# Patient Record
Sex: FEMALE | Race: WHITE | NOT HISPANIC OR LATINO | Employment: OTHER | URBAN - METROPOLITAN AREA
[De-identification: names, ages, dates, MRNs, and addresses within clinical notes are randomized per-mention and may not be internally consistent; named-entity substitution may affect disease eponyms.]

---

## 2017-01-13 ENCOUNTER — OFFICE VISIT (OUTPATIENT)
Dept: MAMMOGRAPHY | Facility: CLINIC | Age: 77
End: 2017-01-13

## 2017-01-13 VITALS
HEART RATE: 98 BPM | SYSTOLIC BLOOD PRESSURE: 132 MMHG | HEIGHT: 63 IN | DIASTOLIC BLOOD PRESSURE: 88 MMHG | WEIGHT: 145 LBS | BODY MASS INDEX: 25.69 KG/M2 | OXYGEN SATURATION: 97 % | TEMPERATURE: 97.8 F

## 2017-01-13 DIAGNOSIS — N63.20 LEFT BREAST LUMP: Primary | ICD-10-CM

## 2017-01-13 PROCEDURE — 99215 OFFICE O/P EST HI 40 MIN: CPT | Performed by: SURGERY

## 2017-01-13 RX ORDER — CLOBETASOL PROPIONATE 0.5 MG/G
OINTMENT TOPICAL
Refills: 0 | COMMUNITY
Start: 2016-12-26 | End: 2017-01-27

## 2017-01-13 RX ORDER — MAGNESIUM 200 MG
TABLET ORAL
COMMUNITY
End: 2017-06-22

## 2017-01-13 NOTE — MR AVS SNAPSHOT
Jessica Davies   1/13/2017 8:45 AM   Office Visit    Dept Phone:  [phone]   Encounter #:  [id number]    Provider:  Oniel Vilchis MD   Department:  [facility] BREAST SURGERY                Your Full Care Plan              Your Updated Medication List          This list is accurate as of: 1/13/17  9:39 AM.  Always use your most recent med list.                CALCIUM + D PO       clobetasol 0.05 % ointment   Commonly known as:  TEMOVATE       clonazePAM 1 MG tablet   Commonly known as:  KlonoPIN   TAKE 1 TABLET BY MOUTH EVERY NIGHT AT BEDTIME       Fish Oil 1360 MG capsule       HYDROcodone-acetaminophen  MG per tablet   Commonly known as:  NORCO       magnesium oxide 400 (241.3 MG) MG tablet tablet   Commonly known as:  MAGOX       MULTIVITAL PO       nystatin 386085 UNIT/GM powder   Commonly known as:  MYCOSTATIN       PROBIOTIC ADVANCED PO       Vitamin B-12 1000 MCG sublingual tablet       Vitamin D 1000 UNITS tablet               Instructions     None    Patient Instructions History      Upcoming Appointments     Visit Type Date Time Department    NEW PATIENT 1/13/2017  8:45 AM [facility]    OFFICE VISIT 6/22/2017 11:00 AM [facility]    FOLLOW UP 7/14/2017  9:30 AM [facility]      MyChart Signup     Our records indicate that you have an active [employer] account.    You can view your After Visit Summary by going to [employer] and logging in with your [employer] username and password.  If you don't have a [employer] username and password but a parent or guardian has access to your record, the parent or guardian should login with their own [employer] username and password and access your record to view the After Visit Summary.    If you have questions, you can email [email or web address] or call [phone] to talk to our [employer] staff.  Remember, [employer] is NOT to be used for urgent needs.  For  "medical emergencies, dial 911.               Other Info from Your Visit           Your Appointments     Jun 22, 2017 11:00 AM EDT   Office Visit with Mike Worrell MD   [facility] PRIMARY CARE (--)    [address]  [facility] [address] [phone]           Arrive 15 minutes prior to appointment.            Jul 14, 2017  9:30 AM EDT   Follow Up with Oniel Vilchis MD   [facility] BREAST SURGERY (--)    [address]  [facility] [address] [phone]           Arrive 15 minutes prior to appointment.              Other Notes About Your Plan     LAST HARRISON 06/06/2016        Allergies     Azithromycin      Cephalexin      Penicillins      Sulfamethoxazole-trimethoprim        Reason for Visit     Mass           Vital Signs     Blood Pressure Pulse Temperature Height Weight Oxygen Saturation    132/88 (BP Location: Left arm) 98 97.8 °F (36.6 °C) 63\" (160 cm) 145 lb (65.8 kg) 97%    Body Mass Index Smoking Status                25.69 kg/m2 Former Smoker            "

## 2017-01-13 NOTE — LETTER
January 14, 2017     Mike Worrell MD  [address]  [facility] [address]    Patient: Jessica Davies   YOB: 1940   Date of Visit: 1/13/2017       Dear Dr. Gm MD:    Thank you for referring Jessica Davies to me for evaluation. Below are the relevant portions of my assessment and plan of care.  Plan:  It has been a number of years since I have seen her but this palpable area does not seem changed in any way on physical examination.  We talked a little bit about the necessity of continued mammographic follow-up.  She has had breast cancers on both sides and although she doesn't need an increased frequency of mammograms she certainly needs to do yearly studies.  We have ordered a mammogram and an ultrasound at the [facility] where she has been getting all of her other films.  I have asked her to call me 2 days after that procedure is done so we can discuss the findings.    If you have questions, please do not hesitate to call me. I look forward to following Jessica along with you.         Sincerely,        Oniel Vilchis MD        CC: No Recipients  Oniel Vilchis MD  1/14/2017 10:55 AM  Signed  Chief Complaint: Jessica Davies is a 76 y.o.. female here today for Mass        History of Present Illness:  Patient presents with breast mass.  She is a nice 76-year-old white female that I last saw in June 2014.  At that time she had discovered a small pea-sized lump in the left breast which had been there approximately 6 months.  Her past history is significant for right breast conserving surgery in 1996 but she did not have radiation therapy or or endocrine blocking therapy.  In 2003 she had a cancer in the left breast treated with breast conserving surgery.  She did receive radiation after that surgery and took endocrine blocking therapy for a short period of time.  At that time her imaging studies were normal and on exam there was a very small BB sized  area in the midportion of her lumpectomy incision.  I felt this likely represented either nodular breast tissue or a small focus of fat necrosis.  The patient can still feel the area but does not feel that it has changed in any way.  She is nervous about getting additional radiation through mammography and her last imaging studies were March 2015.  At that time there was nothing suspicious seen on her mammogram.  She is here today for follow-up of this area.      Review of Systems:  Review of Systems   Constitutional: Positive for unexpected weight change.        20 pound weight gain over 4 years   HENT:   Positive for hearing loss and tinnitus.    Eyes: Positive for eye problems.        Cataracts   Respiratory: Positive for chest tightness.    Endocrine: Positive for hot flashes.   Genitourinary: Positive for dyspareunia and nocturia.    Musculoskeletal: Positive for back pain, gait problem, myalgias and neck stiffness.        Limited ROM in shoulder   Neurological: Positive for gait problem.   All other systems reviewed and are negative.       Past Medical and Surgical History:  Breast Biopsy History:  Patient has had the following breast biopsies:Left breast 2003- malignant, Right breast 1996- Malignant  Breast Cancer HIstory:  Patient has the following past medical history of breast cancer treatment: Right breast cancer 1996-lumpectomy, Left breast Cancer 2003- lumpectomy with radiation.   Breast Operations, and year:  Left Lumpectomy 2003, Right Lumpectomy 1996    History   Smoking Status   • Former Smoker   • Packs/day: 3.00   • Types: Cigarettes   • Start date: 1964   • Quit date: 1974   Smokeless Tobacco   • Not on file     Comment: quit 40 yrs ago     Obstetric History:  Patient is postmenopausal, entered menopause naturally at age: 50   Number of pregnancies:2  Number of live births: 2  Number of abortions or miscarriages: 0  Age of delivery of first child: 22  Patient breast fed, for the following lenth  of time:2 months  Length of time taking birth control pills:8 to 10 years  Patient took hormone replacement during the following dates: off and on for a few years. Unsure of exact dates    Past Surgical History   Procedure Laterality Date   • Breast surgery     • D&c and laparoscopy     • Mohs surgery     • Cataract extraction  2014       Past Medical History   Diagnosis Date   • Birth defect    • Cancer      Breast   • DDD (degenerative disc disease), cervical      Neck and Lumbar   • Diabetes mellitus type 2, controlled, without complications    • Hyperlipidemia        Prior Hospitalizations, other than for surgery or childbirth, and year:  none    Social History:  Patient is .  Patient has 1 daughters. and Patient has 1 sons.    Family History:  Family History   Problem Relation Age of Onset   • Early death Mother    • Alcohol abuse Mother    • Asthma Mother    • Birth defects Mother    • Heart attack Mother    • Heart failure Mother    • Cancer Father      Prostate   • Heart disease Father    • Diabetes Father    • Heart attack Father    • Prostate cancer Father 70   • Hyperlipidemia Sister    • Diabetes Paternal Grandmother    • Diabetes Paternal Grandfather    • Heart failure Maternal Aunt    • Brain cancer Maternal Aunt    • Asthma Paternal Aunt        Vital Signs:  Vitals:    01/13/17 0901   BP: 132/88   Pulse: 98   Temp: 97.8 °F (36.6 °C)   SpO2: 97%       Medications:    Current Outpatient Prescriptions:   •  Prenatal Vit-Min-FA-Fish Oil ([employer] PRENATAL GUMMY PO), Take 2 tablets by mouth daily., Disp: , Rfl:      Physical Examination:  General Appearance:   Patient is in no distress.  She is well kept and has an average build.   Psychiatric:  Patient with appropriate mood and affect. Alert and oriented to self, time, and place.    Breast, RIGHT:  small sized, symmetric with the contralateral side.  Breast skin is without erythema, edema, rashes.  There are no visible abnormalities upon inspection  during the arm-raising maneuver or with hands on hips in the sitting position. There is no nipple retraction, discharge or nipple/areolar skin changes.There are no masses palpable in the sitting or supine positions.  There is a lumpectomy scar in the far lateral aspect of the breast with some associated architectural distortion.    Breast, LEFT:  small sized, symmetric with the contralateral side.  Breast skin is without erythema, edema, rashes.  There are no visible abnormalities upon inspection during the arm-raising maneuver or with hands on hips in the sitting position. There is no nipple retraction, discharge or nipple/areolar skin changes. she has an incision at the superior aspect of the areola from her lumpectomy.  There is some architectural distortion related to this.  In the lateral one third of this incision there is a small BB sized area.  It is well circumscribed smooth and mobile.    Lymphatic:  There is no axillary, cervical, infraclavicular, or supraclavicular adenopathy bilaterally.  Eyes:  Pupils are round and reactive to light.  Cardiovascular:  Heart rate and rhythm are regular.  Respiratory:  Lungs are clear bilaterally with no crackles or wheezes in any lung field.  Gastrointestinal:  Abdomen is soft, nondistended, and nontender.  There is no evidence of hepatosplenomegaly. There are no scars from previous surgery.    Musculoskeletal:  Good strength in all 4 extremities.   There is good range of motion in both shoulders.    Skin:  No new skin lesions or rashes on the skin excluding the breast (see breast exam above).    Assessment:  Diagnoses and all orders for this visit:    Left breast lump  -     Mammo Diagnostic Left With CAD  -     US Breast Left Complete    Other orders  -     clobetasol (TEMOVATE) 0.05 % ointment; NANCY EXT AA BID  -     Calcium Citrate-Vitamin D (CALCIUM + D PO); Take 1,000 mg by mouth.  -     magnesium oxide (MAGOX) 400 (241.3 MG) MG tablet tablet; Take 400 mg by mouth  Daily.  -     Cyanocobalamin (VITAMIN B-12) 1000 MCG sublingual tablet; Place  under the tongue.  -     Omega-3 Fatty Acids (FISH OIL) 1360 MG capsule; Take  by mouth.  -     Multiple Vitamins-Minerals (MULTIVITAL PO); Take  by mouth.  -     Probiotic Product (PROBIOTIC ADVANCED PO); Take  by mouth.      Plan:  It has been a number of years since I have seen her but this palpable area does not seem changed in any way on physical examination.  We talked a little bit about the necessity of continued mammographic follow-up.  She has had breast cancers on both sides and although she doesn't need an increased frequency of mammograms she certainly needs to do yearly studies.  We have ordered a mammogram and an ultrasound at the [facility] on [place] where she has been getting all of her other films.  I have asked her to call me 2 days after that procedure is done so we can discuss the findings.      CPT coding:    Next Appointment:  No Follow-up on file.

## 2017-01-13 NOTE — PROGRESS NOTES
Chief Complaint: Jessica Davies is a 76 y.o.. female here today for Mass        History of Present Illness:  Patient presents with breast mass.  She is a nice 76-year-old white female that I last saw in June 2014.  At that time she had discovered a small pea-sized lump in the left breast which had been there approximately 6 months.  Her past history is significant for right breast conserving surgery in 1996 but she did not have radiation therapy or or endocrine blocking therapy.  In 2003 she had a cancer in the left breast treated with breast conserving surgery.  She did receive radiation after that surgery and took endocrine blocking therapy for a short period of time.  At that time her imaging studies were normal and on exam there was a very small BB sized area in the midportion of her lumpectomy incision.  I felt this likely represented either nodular breast tissue or a small focus of fat necrosis.  The patient can still feel the area but does not feel that it has changed in any way.  She is nervous about getting additional radiation through mammography and her last imaging studies were March 2015.  At that time there was nothing suspicious seen on her mammogram.  She is here today for follow-up of this area.      Review of Systems:  Review of Systems   Constitutional: Positive for unexpected weight change.        20 pound weight gain over 4 years   HENT:   Positive for hearing loss and tinnitus.    Eyes: Positive for eye problems.        Cataracts   Respiratory: Positive for chest tightness.    Endocrine: Positive for hot flashes.   Genitourinary: Positive for dyspareunia and nocturia.    Musculoskeletal: Positive for back pain, gait problem, myalgias and neck stiffness.        Limited ROM in shoulder   Neurological: Positive for gait problem.   All other systems reviewed and are negative.       Past Medical and Surgical History:  Breast Biopsy History:  Patient has had the following breast biopsies:Left  breast 2003- malignant, Right breast 1996- Malignant  Breast Cancer HIstory:  Patient has the following past medical history of breast cancer treatment: Right breast cancer 1996-lumpectomy, Left breast Cancer 2003- lumpectomy with radiation.   Breast Operations, and year:  Left Lumpectomy 2003, Right Lumpectomy 1996    History   Smoking Status   • Former Smoker   • Packs/day: 3.00   • Types: Cigarettes   • Start date: 1964   • Quit date: 1974   Smokeless Tobacco   • Not on file     Comment: quit 40 yrs ago     Obstetric History:  Patient is postmenopausal, entered menopause naturally at age: 50   Number of pregnancies:2  Number of live births: 2  Number of abortions or miscarriages: 0  Age of delivery of first child: 22  Patient breast fed, for the following lenth of time:2 months  Length of time taking birth control pills:8 to 10 years  Patient took hormone replacement during the following dates: off and on for a few years. Unsure of exact dates    Past Surgical History   Procedure Laterality Date   • Breast surgery     • D&c and laparoscopy     • Mohs surgery     • Cataract extraction  2014       Past Medical History   Diagnosis Date   • Birth defect    • Cancer      Breast   • DDD (degenerative disc disease), cervical      Neck and Lumbar   • Diabetes mellitus type 2, controlled, without complications    • Hyperlipidemia        Prior Hospitalizations, other than for surgery or childbirth, and year:  none    Social History:  Patient is .  Patient has 1 daughters. and Patient has 1 sons.    Family History:  Family History   Problem Relation Age of Onset   • Early death Mother    • Alcohol abuse Mother    • Asthma Mother    • Birth defects Mother    • Heart attack Mother    • Heart failure Mother    • Cancer Father      Prostate   • Heart disease Father    • Diabetes Father    • Heart attack Father    • Prostate cancer Father 70   • Hyperlipidemia Sister    • Diabetes Paternal Grandmother    • Diabetes  Paternal Grandfather    • Heart failure Maternal Aunt    • Brain cancer Maternal Aunt    • Asthma Paternal Aunt        Vital Signs:  Vitals:    01/13/17 0901   BP: 132/88   Pulse: 98   Temp: 97.8 °F (36.6 °C)   SpO2: 97%       Medications:    Current Outpatient Prescriptions:   •  Prenatal Vit-Min-FA-Fish Oil ([employer] PRENATAL GUMMY PO), Take 2 tablets by mouth daily., Disp: , Rfl:      Physical Examination:  General Appearance:   Patient is in no distress.  She is well kept and has an average build.   Psychiatric:  Patient with appropriate mood and affect. Alert and oriented to self, time, and place.    Breast, RIGHT:  small sized, symmetric with the contralateral side.  Breast skin is without erythema, edema, rashes.  There are no visible abnormalities upon inspection during the arm-raising maneuver or with hands on hips in the sitting position. There is no nipple retraction, discharge or nipple/areolar skin changes.There are no masses palpable in the sitting or supine positions.  There is a lumpectomy scar in the far lateral aspect of the breast with some associated architectural distortion.    Breast, LEFT:  small sized, symmetric with the contralateral side.  Breast skin is without erythema, edema, rashes.  There are no visible abnormalities upon inspection during the arm-raising maneuver or with hands on hips in the sitting position. There is no nipple retraction, discharge or nipple/areolar skin changes. she has an incision at the superior aspect of the areola from her lumpectomy.  There is some architectural distortion related to this.  In the lateral one third of this incision there is a small BB sized area.  It is well circumscribed smooth and mobile.    Lymphatic:  There is no axillary, cervical, infraclavicular, or supraclavicular adenopathy bilaterally.  Eyes:  Pupils are round and reactive to light.  Cardiovascular:  Heart rate and rhythm are regular.  Respiratory:  Lungs are clear bilaterally with no  crackles or wheezes in any lung field.  Gastrointestinal:  Abdomen is soft, nondistended, and nontender.  There is no evidence of hepatosplenomegaly. There are no scars from previous surgery.    Musculoskeletal:  Good strength in all 4 extremities.   There is good range of motion in both shoulders.    Skin:  No new skin lesions or rashes on the skin excluding the breast (see breast exam above).    Assessment:  Diagnoses and all orders for this visit:    Left breast lump  -     Mammo Diagnostic Left With CAD  -     US Breast Left Complete    Other orders  -     clobetasol (TEMOVATE) 0.05 % ointment; NANCY EXT AA BID  -     Calcium Citrate-Vitamin D (CALCIUM + D PO); Take 1,000 mg by mouth.  -     magnesium oxide (MAGOX) 400 (241.3 MG) MG tablet tablet; Take 400 mg by mouth Daily.  -     Cyanocobalamin (VITAMIN B-12) 1000 MCG sublingual tablet; Place  under the tongue.  -     Omega-3 Fatty Acids (FISH OIL) 1360 MG capsule; Take  by mouth.  -     Multiple Vitamins-Minerals (MULTIVITAL PO); Take  by mouth.  -     Probiotic Product (PROBIOTIC ADVANCED PO); Take  by mouth.      Plan:  It has been a number of years since I have seen her but this palpable area does not seem changed in any way on physical examination.  We talked a little bit about the necessity of continued mammographic follow-up.  She has had breast cancers on both sides and although she doesn't need an increased frequency of mammograms she certainly needs to do yearly studies.  We have ordered a mammogram and an ultrasound at the [facility] on [place] where she has been getting all of her other films.  I have asked her to call me 2 days after that procedure is done so we can discuss the findings.      CPT coding:    Next Appointment:  No Follow-up on file.

## 2017-01-20 ENCOUNTER — TELEPHONE (OUTPATIENT)
Dept: MAMMOGRAPHY | Facility: CLINIC | Age: 77
End: 2017-01-20

## 2017-01-20 NOTE — TELEPHONE ENCOUNTER
I told her that the mammogram showed no change in all and nothing suspicious.  The ultrasound reveals that the palpable mass is an 8 mm calcification and shows no signs of malignancy.  I think she can return to routine follow-up and I will just see her on an as-needed basis.

## 2017-01-26 ENCOUNTER — TELEPHONE (OUTPATIENT)
Dept: FAMILY MEDICINE CLINIC | Facility: CLINIC | Age: 77
End: 2017-01-26

## 2017-01-26 NOTE — TELEPHONE ENCOUNTER
"01.26.17 -   I spoke to patient and I wanted to know your recommendations. I already informed patient to schedule F/U, but she chose to wait until I spoke with you. Patient stated she experienced flashes of light in the shape of a \"Z\" in her peripheral vision in the past, but when she was reading the paper aloud to her , she was unable to complete the words. I tried to get clarification from patient to determine if she was having stroke-like symptoms, but she denies extremity weakness and slurred speech.    ----- Message from Victoria Murillo sent at 1/26/2017  1:59 PM EST -----  Patient called wants to speak someone she had an episode this am, while reading paper, flashes of light, with some dizziness.  I offered an appointment but patient wants someone to call.  [phone]    "

## 2017-01-27 ENCOUNTER — OFFICE VISIT (OUTPATIENT)
Dept: FAMILY MEDICINE CLINIC | Facility: CLINIC | Age: 77
End: 2017-01-27

## 2017-01-27 VITALS
WEIGHT: 147 LBS | SYSTOLIC BLOOD PRESSURE: 118 MMHG | HEART RATE: 78 BPM | DIASTOLIC BLOOD PRESSURE: 72 MMHG | BODY MASS INDEX: 26.05 KG/M2 | OXYGEN SATURATION: 99 % | HEIGHT: 63 IN

## 2017-01-27 DIAGNOSIS — R47.01 RECEPTIVE APHASIA: ICD-10-CM

## 2017-01-27 DIAGNOSIS — H53.8 BLURRED VISION, RIGHT EYE: Primary | ICD-10-CM

## 2017-01-27 PROCEDURE — 99214 OFFICE O/P EST MOD 30 MIN: CPT | Performed by: NURSE PRACTITIONER

## 2017-01-27 NOTE — PROGRESS NOTES
Subjective   Jessica Davies is a 76 y.o. female.     History of Present Illness Patient states that she had necks and shoulder soreness. Yesterday she had flashing lights in the shape of Z's, she was also unable to comprehend words while reading the newspaper and was unable to pronounce some of the words. The episode lasted 5-10 minutes. Pt denies chest pain, SOA, or muscle weakness.    The following portions of the patient's history were reviewed and updated as appropriate: allergies, current medications, past family history, past medical history, past social history, past surgical history and problem list.    Review of Systems   Neurological: Positive for dizziness and speech difficulty.       Objective   Physical Exam   Constitutional: She appears well-developed and well-nourished.   HENT:   Head: Normocephalic and atraumatic.   Right Ear: External ear normal.   Left Ear: External ear normal.   Nose: Nose normal.   Mouth/Throat: Oropharynx is clear and moist.   Eyes: Conjunctivae and EOM are normal. Pupils are equal, round, and reactive to light.   Neck: Normal range of motion. Neck supple. No JVD present. No tracheal deviation present. No thyromegaly present.   Cardiovascular: Normal rate, regular rhythm, normal heart sounds and intact distal pulses.    Pulmonary/Chest: Effort normal and breath sounds normal. No stridor.   Abdominal: Soft.   Musculoskeletal: Normal range of motion.   Lymphadenopathy:     She has no cervical adenopathy.   Neurological: She is alert. She has normal strength and normal reflexes. She displays normal reflexes. No cranial nerve deficit or sensory deficit. She exhibits normal muscle tone. She displays a negative Romberg sign. Coordination and gait normal.   Reflex Scores:       Tricep reflexes are 2+ on the right side and 2+ on the left side.       Bicep reflexes are 2+ on the right side and 2+ on the left side.       Brachioradialis reflexes are 2+ on the right side and 2+ on the  left side.       Patellar reflexes are 2+ on the right side and 2+ on the left side.       Achilles reflexes are 2+ on the right side and 2+ on the left side.  Skin: Skin is warm and dry.   Psychiatric: She has a normal mood and affect. Her behavior is normal. Judgment normal.   Nursing note and vitals reviewed.      Assessment/Plan   Jessica was seen today for neck pain.    Diagnoses and all orders for this visit:    Blurred vision, right eye  -     MRI Brain With & Without Contrast  -     Ambulatory Referral to Neurology    Receptive aphasia       is with pt and is driving. Discussed with Dr Worrell and pt will get a MRI brain and referral neuro.

## 2017-01-27 NOTE — MR AVS SNAPSHOT
Jessica JENIFER Davies   1/27/2017 10:40 AM   Office Visit    Provider:  LEI Cuba   Department:  [facility] PRIMARY CARE   Dept Phone:  [phone]                Your Full Care Plan              Today's Medication Changes          These changes are accurate as of: 1/27/17 12:00 PM.  If you have any questions, ask your nurse or doctor.               Stop taking medication(s)listed here:     clobetasol 0.05 % ointment   Commonly known as:  TEMOVATE   Stopped by:  LEI Cuba                      Your Updated Medication List          This list is accurate as of: 1/27/17 12:00 PM.  Always use your most recent med list.                CALCIUM + D PO       clonazePAM 1 MG tablet   Commonly known as:  KlonoPIN   TAKE 1 TABLET BY MOUTH EVERY NIGHT AT BEDTIME       Fish Oil 1360 MG capsule       HYDROcodone-acetaminophen  MG per tablet   Commonly known as:  NORCO       magnesium oxide 400 (241.3 MG) MG tablet tablet   Commonly known as:  MAGOX       MULTIVITAL PO       nystatin 755212 UNIT/GM powder   Commonly known as:  MYCOSTATIN       PROBIOTIC ADVANCED PO       Vitamin B-12 1000 MCG sublingual tablet       Vitamin D 1000 UNITS tablet               We Performed the Following     Ambulatory Referral to Neurology     MRI Brain With & Without Contrast       You Were Diagnosed With        Codes Comments    Blurred vision, right eye    -  Primary ICD-10-CM: H53.8  ICD-9-CM: 368.8     Receptive aphasia     ICD-10-CM: R47.01  ICD-9-CM: 784.3       Instructions     None    Patient Instructions History      [facility] Sign[facility]     Our records indicate that you have an active [employer] account.    You can view your After Visit Summary by going to [employer] and logging in with your [employer] username and password.  If you don't have a [employer] username and password but a parent or guardian has access to your record, the parent or guardian should  "login with their own DocVerse username and password and access your record to view the After Visit Summary.    If you have questions, you can email [email or web address] or call [phone] to talk to our DocVerse staff.  Remember, DocVerse is NOT to be used for urgent needs.  For medical emergencies, dial 911.               Other Info from Your Visit           Your Appointments     Jun 22, 2017 11:00 AM EDT   Office Visit with Mike Worrell MD   [facility] PRIMARY CARE (--)    [address]  [facility] [address] [phone]           Arrive 15 minutes prior to appointment.            Jul 14, 2017  9:30 AM EDT   Follow Up with Oniel Vilchis MD   [facility] BREAST SURGERY (--)    [address]  [facility] [address] [phone]           Arrive 15 minutes prior to appointment.              Other Notes About Your Plan     LAST HARRISON 06/06/2016        Allergies     Azithromycin      Cephalexin      Penicillins      Sulfamethoxazole-trimethoprim        Reason for Visit     Neck Pain           Vital Signs     Blood Pressure Pulse Height Weight Oxygen Saturation Body Mass Index    118/72 78 63\" (160 cm) 147 lb (66.7 kg) 99% 26.04 kg/m2    Smoking Status                   Former Smoker           Problems and Diagnoses Noted     Blurred vision, right eye    -  Primary    Inability to understand speech          "

## 2017-01-27 NOTE — TELEPHONE ENCOUNTER
Dr. Worrell spoke to patient the evening of 01.26.17 to discuss symptoms.  F/U scheduled today with Stefany Waters NP.

## 2017-01-30 ENCOUNTER — DOCUMENTATION (OUTPATIENT)
Dept: FAMILY MEDICINE CLINIC | Facility: CLINIC | Age: 77
End: 2017-01-30

## 2017-01-30 ENCOUNTER — TELEPHONE (OUTPATIENT)
Dept: FAMILY MEDICINE CLINIC | Facility: CLINIC | Age: 77
End: 2017-01-30

## 2017-01-30 NOTE — PROGRESS NOTES
I spoke to pt about MRI findings being negative for acute intracranial findings . She will follow with Dr Worrell regarding her neck pain and if she experiences any more episodes of blurred vision she is to go to the ER.

## 2017-02-02 ENCOUNTER — TELEPHONE (OUTPATIENT)
Dept: FAMILY MEDICINE CLINIC | Facility: CLINIC | Age: 77
End: 2017-02-02

## 2017-02-02 DIAGNOSIS — M54.2 NECK PAIN: Primary | ICD-10-CM

## 2017-02-02 NOTE — TELEPHONE ENCOUNTER
Orders placed. Patient made aware. She is coming in on Tuesday to see Dr. Worrell, she will have blood work done at that time.

## 2017-02-02 NOTE — TELEPHONE ENCOUNTER
----- Message from Victoria Murillo sent at 2/1/2017  2:55 PM EST -----  Dr. Oquendo office called has agreed to see patient, she is schedule in May but Dr. Oquendo is asking us to order a Sed Rate, CRP, prior to the appt.

## 2017-02-07 ENCOUNTER — RESULTS ENCOUNTER (OUTPATIENT)
Dept: FAMILY MEDICINE CLINIC | Facility: CLINIC | Age: 77
End: 2017-02-07

## 2017-02-07 ENCOUNTER — OFFICE VISIT (OUTPATIENT)
Dept: FAMILY MEDICINE CLINIC | Facility: CLINIC | Age: 77
End: 2017-02-07

## 2017-02-07 VITALS
OXYGEN SATURATION: 97 % | RESPIRATION RATE: 16 BRPM | DIASTOLIC BLOOD PRESSURE: 70 MMHG | HEIGHT: 63 IN | HEART RATE: 76 BPM | WEIGHT: 144 LBS | SYSTOLIC BLOOD PRESSURE: 114 MMHG | BODY MASS INDEX: 25.52 KG/M2

## 2017-02-07 DIAGNOSIS — Z87.39 H/O OSTEOPENIA: ICD-10-CM

## 2017-02-07 DIAGNOSIS — R47.01 RECEPTIVE APHASIA: ICD-10-CM

## 2017-02-07 DIAGNOSIS — Z13.820 ENCOUNTER FOR SCREENING FOR OSTEOPOROSIS: ICD-10-CM

## 2017-02-07 DIAGNOSIS — M54.2 CERVICALGIA: Primary | ICD-10-CM

## 2017-02-07 DIAGNOSIS — M54.2 NECK PAIN: ICD-10-CM

## 2017-02-07 PROBLEM — B00.9 HERPES: Status: ACTIVE | Noted: 2017-02-07

## 2017-02-07 PROBLEM — E78.00 ELEVATED CHOLESTEROL: Status: ACTIVE | Noted: 2017-02-07

## 2017-02-07 LAB
CRP SERPL-MCNC: 0.14 MG/DL (ref 0–0.5)
ERYTHROCYTE [SEDIMENTATION RATE] IN BLOOD BY WESTERGREN METHOD: 8 MM/HR (ref 0–30)

## 2017-02-07 PROCEDURE — 99214 OFFICE O/P EST MOD 30 MIN: CPT | Performed by: FAMILY MEDICINE

## 2017-02-07 RX ORDER — HYDROCODONE BITARTRATE AND ACETAMINOPHEN 10; 325 MG/1; MG/1
1 TABLET ORAL EVERY 6 HOURS
COMMUNITY
Start: 2014-10-29 | End: 2017-05-22 | Stop reason: SDUPTHER

## 2017-02-07 NOTE — PROGRESS NOTES
Subjective   Jessica Davies is a 76 y.o. female.     History of Present Illness   Pat is here to go over recent MRI results.  She had an episode of weakness 2 weeks ago associated w/ visual disturbance and aphasia that lasted for a few minutes and then resolved. She was seen in the office the next day. She has a hx of optical migraines. MRI of the brain is on chart and is unremarkable with no cause for symptoms revealed.     She has no further episodes but continues to feel tightness in her upper back, head and neck. She has a hx of cervical stenosis. She has not had treatment for this recentl.     She also has a hx of osteopenia and needs a repeat DEXA.    The following portions of the patient's history were reviewed and updated as appropriate: allergies, current medications, past medical history, past social history and problem list.    Review of Systems   Constitutional: Positive for activity change.   Eyes: Positive for visual disturbance.   Musculoskeletal: Positive for myalgias (Soreness on the Lt side of her body).   Neurological: Positive for headaches.   All other systems reviewed and are negative.      Objective   Physical Exam   Constitutional: She is oriented to person, place, and time. She appears well-developed.   Neck: Normal range of motion.   Cardiovascular: Normal rate and regular rhythm.    Pulmonary/Chest: Effort normal.   Neurological: She is alert and oriented to person, place, and time. No cranial nerve deficit. Coordination normal.   Psychiatric: She has a normal mood and affect. Her behavior is normal. Judgment and thought content normal.   Nursing note and vitals reviewed.      Assessment/Plan   Diagnoses and all orders for this visit:    Cervicalgia  -     Ambulatory Referral to Physical Therapy Evaluate and treat    H/O osteopenia    Encounter for screening for osteoporosis  -     DEXA Bone Density Axial; Future    Receptive aphasia  No cause was found and this could have been a TIA or  a migraine variant. I have advised Pat and she has agreed to go to the ER if symptoms occur again.

## 2017-02-07 NOTE — PATIENT INSTRUCTIONS
I have referred you to PT for your neck pain.  I will call you with lab results.  DEXA scan has been ordered.     Please go to ER if stroke like symptoms recur again.

## 2017-03-10 ENCOUNTER — TELEPHONE (OUTPATIENT)
Dept: FAMILY MEDICINE CLINIC | Facility: CLINIC | Age: 77
End: 2017-03-10

## 2017-03-10 NOTE — TELEPHONE ENCOUNTER
She has osteopenia, not osteoporosis. Encourage her to continue walking and weight bearing exercise. We will repeat this again in 2 years.

## 2017-03-10 NOTE — TELEPHONE ENCOUNTER
Patient called requesting the results of her DEXA that was performed in February.  I apologized to the patient for the delay and she was okay. Please advise.

## 2017-03-21 RX ORDER — CLONAZEPAM 1 MG/1
TABLET ORAL
Qty: 60 TABLET | Refills: 0 | Status: SHIPPED | OUTPATIENT
Start: 2017-03-21 | End: 2017-03-21 | Stop reason: SDUPTHER

## 2017-03-21 RX ORDER — CLONAZEPAM 1 MG/1
1 TABLET ORAL 2 TIMES DAILY PRN
Qty: 60 TABLET | Refills: 0 | OUTPATIENT
Start: 2017-03-21 | End: 2018-07-27 | Stop reason: SDUPTHER

## 2017-03-21 NOTE — TELEPHONE ENCOUNTER
HARRISON has been placed in your folder for review.  Controlled Sub. Contract needs to be updated at next OV.

## 2017-03-23 DIAGNOSIS — H91.90 HEARING LOSS, UNSPECIFIED LATERALITY: Primary | ICD-10-CM

## 2017-05-22 ENCOUNTER — OFFICE VISIT (OUTPATIENT)
Dept: NEUROLOGY | Facility: CLINIC | Age: 77
End: 2017-05-22

## 2017-05-22 VITALS
HEART RATE: 75 BPM | DIASTOLIC BLOOD PRESSURE: 78 MMHG | HEIGHT: 63 IN | OXYGEN SATURATION: 98 % | SYSTOLIC BLOOD PRESSURE: 132 MMHG | WEIGHT: 144.2 LBS | BODY MASS INDEX: 25.55 KG/M2

## 2017-05-22 DIAGNOSIS — G45.1 HEMISPHERIC CAROTID ARTERY SYNDROME: ICD-10-CM

## 2017-05-22 DIAGNOSIS — M48.02 SPINAL STENOSIS OF CERVICAL REGION: Primary | ICD-10-CM

## 2017-05-22 DIAGNOSIS — G25.81 RESTLESS LEGS SYNDROME (RLS): ICD-10-CM

## 2017-05-22 DIAGNOSIS — Z87.39 H/O OSTEOPENIA: ICD-10-CM

## 2017-05-22 DIAGNOSIS — M54.6 CHRONIC BILATERAL THORACIC BACK PAIN: ICD-10-CM

## 2017-05-22 DIAGNOSIS — G89.29 CHRONIC BILATERAL THORACIC BACK PAIN: ICD-10-CM

## 2017-05-22 PROCEDURE — 99204 OFFICE O/P NEW MOD 45 MIN: CPT | Performed by: PSYCHIATRY & NEUROLOGY

## 2017-05-22 RX ORDER — PNV NO.95/FERROUS FUM/FOLIC AC 28MG-0.8MG
TABLET ORAL
COMMUNITY
End: 2018-07-27

## 2017-05-22 RX ORDER — GABAPENTIN 100 MG/1
CAPSULE ORAL
Qty: 180 CAPSULE | Refills: 2 | Status: SHIPPED | OUTPATIENT
Start: 2017-05-22 | End: 2017-06-23

## 2017-05-22 RX ORDER — METOPROLOL SUCCINATE 25 MG/1
TABLET, EXTENDED RELEASE ORAL
Refills: 3 | COMMUNITY
Start: 2017-03-31 | End: 2017-05-22

## 2017-05-22 RX ORDER — METOPROLOL SUCCINATE 50 MG/1
TABLET, EXTENDED RELEASE ORAL
Refills: 5 | COMMUNITY
Start: 2017-02-24 | End: 2017-05-22 | Stop reason: SDUPTHER

## 2017-06-01 ENCOUNTER — TELEPHONE (OUTPATIENT)
Dept: NEUROLOGY | Facility: CLINIC | Age: 77
End: 2017-06-01

## 2017-06-01 NOTE — TELEPHONE ENCOUNTER
----- Message from Allyson Berman sent at 6/1/2017  3:10 PM EDT -----  Contact: [phone]  Mri Results

## 2017-06-02 ENCOUNTER — TELEPHONE (OUTPATIENT)
Dept: NEUROLOGY | Facility: CLINIC | Age: 77
End: 2017-06-02

## 2017-06-02 DIAGNOSIS — M48.02 SPINAL STENOSIS OF CERVICAL REGION: Primary | ICD-10-CM

## 2017-06-02 DIAGNOSIS — R93.7 ABNORMAL MRI, THORACIC SPINE: ICD-10-CM

## 2017-06-02 NOTE — TELEPHONE ENCOUNTER
i called her b/c of the thoraci MRI abnormality -  A 'web'-     She has common seen pains in her neck and back, BUT also has the thoracic pain (with difficulty walking; cyst vs web on MRI). ?Correlation?    THis finding was present since 2014 on a cervical MRI.and looks the same now, it may be causing more symptoms.     I recommended nsgy OV to evaluate the thoracic web or possible dorsal cyst.     AND She will bring her current scans and the 2014 discs to the OV>    She will see dr tony.  Sikh scheduling has still not send her to see Dr. Fry yet

## 2017-06-22 ENCOUNTER — OFFICE VISIT (OUTPATIENT)
Dept: FAMILY MEDICINE CLINIC | Facility: CLINIC | Age: 77
End: 2017-06-22

## 2017-06-22 VITALS
WEIGHT: 143 LBS | OXYGEN SATURATION: 94 % | DIASTOLIC BLOOD PRESSURE: 80 MMHG | HEART RATE: 60 BPM | RESPIRATION RATE: 16 BRPM | BODY MASS INDEX: 25.34 KG/M2 | HEIGHT: 63 IN | SYSTOLIC BLOOD PRESSURE: 140 MMHG

## 2017-06-22 DIAGNOSIS — R79.0 LOW FERRITIN LEVEL: Primary | ICD-10-CM

## 2017-06-22 DIAGNOSIS — R30.0 DYSURIA: ICD-10-CM

## 2017-06-22 DIAGNOSIS — I48.0 PAROXYSMAL ATRIAL FIBRILLATION (HCC): ICD-10-CM

## 2017-06-22 PROBLEM — R06.09 DYSPNEA ON EXERTION: Status: ACTIVE | Noted: 2017-04-17

## 2017-06-22 LAB
BILIRUB BLD-MCNC: NEGATIVE MG/DL
CLARITY, POC: CLEAR
COLOR UR: YELLOW
GLUCOSE UR STRIP-MCNC: NEGATIVE MG/DL
KETONES UR QL: NEGATIVE
LEUKOCYTE EST, POC: NEGATIVE
NITRITE UR-MCNC: NEGATIVE MG/ML
PH UR: 6 [PH] (ref 5–8)
PROT UR STRIP-MCNC: NEGATIVE MG/DL
RBC # UR STRIP: NEGATIVE /UL
SP GR UR: 1.01 (ref 1–1.03)
UROBILINOGEN UR QL: NORMAL

## 2017-06-22 PROCEDURE — 81002 URINALYSIS NONAUTO W/O SCOPE: CPT | Performed by: FAMILY MEDICINE

## 2017-06-22 PROCEDURE — 99214 OFFICE O/P EST MOD 30 MIN: CPT | Performed by: FAMILY MEDICINE

## 2017-06-22 NOTE — PROGRESS NOTES
Subjective   Jessica Davies is a 76 y.o. female.     History of Present Illness   Mary is here for 6 mofollow up. She was dxd with afib in late February.   She hit her head on a tree branch 6/1 and went to ER.     Mary states she is doing well and having no health concerns at present except that she was dxd with a low ferritin and this was determined to be the possible cause of restless legs. She was seen and checked by gastroenterology and had  Ferritin of 40 but it has been as low as 11.   She has a hx of paroxysmal afib and is on Eliquis, followed by cardiology. She is doing better but wants to discuss current treatment.  She is also c/o urinary/genital discomfort. This is not new but she feels it is a little worse and she wonders if it is related to the medications she is taking now. She has seen gyn for this in the past but was not able to tolerate topical meds prescribed.    The following portions of the patient's history were reviewed and updated as appropriate: allergies, current medications, past medical history, past social history and problem list.    Review of Systems   All other systems reviewed and are negative.      Objective   Physical Exam   Constitutional: She is oriented to person, place, and time. She appears well-developed.   Neck: Normal range of motion.   Cardiovascular: Normal rate and regular rhythm.    Pulmonary/Chest: Effort normal.   Neurological: She is alert and oriented to person, place, and time. No cranial nerve deficit. Coordination normal.   Psychiatric: She has a normal mood and affect. Her behavior is normal. Judgment and thought content normal.   Nursing note and vitals reviewed.      Assessment/Plan   Jessica was seen today for restless legs syndrome and atrial fibrillation.    Diagnoses and all orders for this visit:    Low ferritin level  I have advised OTC iron, every other day to help avoid constipation, f/u with me in 2 months for a ferritin level.    Dysuria  -     POCT  urinalysis dipstick, multipro  UA was negative and I suspect that this is due to long term problems with skin irritation in genital area. I have advised OTC meds for symptoms.    Paroxysmal atrial fibrillation    I have advised staying on Eliquis for now because of her hx of a recent stroke.

## 2017-06-22 NOTE — PATIENT INSTRUCTIONS
Please take over the counter iron every other day,colace and lots of water.   Let's check your ferritin and CBC in 2 months

## 2017-06-23 ENCOUNTER — TELEPHONE (OUTPATIENT)
Dept: NEUROLOGY | Facility: CLINIC | Age: 77
End: 2017-06-23

## 2017-06-23 ENCOUNTER — OFFICE VISIT (OUTPATIENT)
Dept: NEUROSURGERY | Facility: CLINIC | Age: 77
End: 2017-06-23

## 2017-06-23 VITALS
HEART RATE: 66 BPM | DIASTOLIC BLOOD PRESSURE: 84 MMHG | SYSTOLIC BLOOD PRESSURE: 136 MMHG | HEIGHT: 63 IN | RESPIRATION RATE: 16 BRPM | WEIGHT: 144 LBS | BODY MASS INDEX: 25.52 KG/M2

## 2017-06-23 DIAGNOSIS — R93.7 ABNORMAL MRI, THORACIC SPINE: Primary | ICD-10-CM

## 2017-06-23 PROCEDURE — 99203 OFFICE O/P NEW LOW 30 MIN: CPT | Performed by: NEUROLOGICAL SURGERY

## 2017-06-23 RX ORDER — ACETAMINOPHEN 500 MG
500 TABLET ORAL EVERY 6 HOURS PRN
COMMUNITY

## 2017-06-23 NOTE — TELEPHONE ENCOUNTER
We referred to Dr. Fry and I guess this is hasn't been accomplished.  We also referred to Dr. Taveras and this was already done

## 2017-06-23 NOTE — PROGRESS NOTES
Subjective   Patient ID: Jessica Davies is a 76 y.o. female is being seen for consultation today at the request of Zak Oquendo MD for an abnormal MRI. She had a MRI thoracic on 5/30/17. She presents today accompanied by her .     History of Present Illness   Patient presents for evaluation and treatment recommendations of abnormal thoracic imaging. She had a TIA early this year. She was later found to have atrial fibrillation. She was referred Dr. Oquendo for TIA evaluation. She complained to him of mid back pain for at least 5 years that has been progressively worse. It is brought on by prolonged sitting or standing. It is aggravated by lifting or bending. Sitting in a chair with a back or lying helps. She has used OTC pain relievers at times with minimal benefit. She uses hydrocodone rarely but she feels she could take it 2-3 times per week. It does help. She has some pain in the left groin and anterior thigh. She admits (reluctantly) to imbalance as well as leg weakness. She denies incontinence.     She reports striking her head on a tree limb on 6/1 when she bent over. She was seen in ER with normal CTH. She also reports neck stiffness and right arm pain for the last few weeks- it goes into the hand and all fingers. It has improved with some ROM exercises.    The following portions of the patient's history were reviewed and updated as appropriate: allergies, current medications, past family history, past medical history, past social history, past surgical history and problem list.    Review of Systems   Constitutional: Negative for fever.   HENT: Negative for trouble swallowing.    Eyes: Negative for visual disturbance.   Respiratory: Negative for cough and wheezing.    Cardiovascular: Negative for chest pain.   Gastrointestinal: Negative for abdominal pain.   Genitourinary: Negative for dysuria.   Musculoskeletal: Positive for back pain (always), gait problem, neck pain (constant) and neck stiffness.    Neurological: Positive for weakness (leg), numbness (right hand/ fingers) and headaches.   Psychiatric/Behavioral: Negative for confusion and sleep disturbance.       Objective   Physical Exam   Constitutional: She is oriented to person, place, and time. She appears well-developed and well-nourished.   Neck: Neck supple.   Pulmonary/Chest: Effort normal.   Musculoskeletal:        Cervical back: She exhibits pain (with ROM; negative Lhermitte's and spurling). She exhibits normal range of motion.        Thoracic back: She exhibits no tenderness and no bony tenderness.        Lumbar back: She exhibits no tenderness and no bony tenderness.        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   Negative SLR   Neurological: She is alert and oriented to person, place, and time. She has an abnormal Romberg Test. She has a normal Finger-Nose-Finger Test and a normal Tandem Gait Test. Gait normal.   Reflex Scores:       Tricep reflexes are 2+ on the right side and 2+ on the left side.       Bicep reflexes are 2+ on the right side and 2+ on the left side.       Brachioradialis reflexes are 2+ on the right side and 2+ on the left side.       Patellar reflexes are 2+ on the right side and 2+ on the left side.       Achilles reflexes are 2+ on the right side and 2+ on the left side.  Skin: Skin is warm and dry.   Psychiatric: She has a normal mood and affect. Her speech is normal and behavior is normal. Judgment and thought content normal.   Vitals reviewed.    Neurologic Exam     Mental Status   Oriented to person, place, and time.   Follows 3 step commands.   Attention: normal. Concentration: normal.   Speech: speech is normal   Level of consciousness: alert  Knowledge: good.   Normal comprehension.     Motor Exam   Muscle bulk: normal  Overall muscle tone: normal    Strength   Strength 5/5 except as noted.        Left intrinsics due to deformity     Sensory Exam   Light touch normal.   Right leg  vibration: normal  Left leg vibration: normal  Right leg pinprick: normal  Left leg pinprick: normal       Temperature intact to cold adi LE     Gait, Coordination, and Reflexes     Gait  Gait: normal    Coordination   Romberg: positive  Finger to nose coordination: normal  Tandem walking coordination: normal    Reflexes   Right brachioradialis: 2+  Left brachioradialis: 2+  Right biceps: 2+  Left biceps: 2+  Right triceps: 2+  Left triceps: 2+  Right patellar: 2+  Left patellar: 2+  Right achilles: 2+  Left achilles: 2+  Right : 2+  Left : 2+  Right Enriquez: absent  Left Enriuqez reflex present: RUCHI due to deformity.  Right ankle clonus: absent  Left ankle clonus: absent      Assessment/Plan   Independent Review of Radiographic Studies:    I personally reviewed the MRI scan of thoracic spine MRA brain and head: The striking imaging change is present in the mid thoracic spine where there is thickening of the arachnoidal membrane.  This is a question as to whether this is an arachnoid cyst or simple scarring of the arachnoidal system.  There is some mild evidence of compression of the thoracic cord.  Comparing this to 2014 there does not appear to be any difference.  Medical Decision Making:    I confirmed and obtained the above history as recorded by the nurse practitioner acting as a scribe. I performed the above examination and it is documented by the nurse practitioner acting as a scribe.    The patient presents with the above-noted history and physical findings.  While the MRI is of concern it is stable.  I'm concerned that any surgical intervention with the hope of decompressing the thoracic spinal cord with simply result in more scarring and probably continued issues so therefore no surgical intervention is indicated.    I did suggest that if she has continuing an obvious worsening of her gait or balance that I would be happy to see her back and consider a myelographic evaluation to see whether or not  "this is a cyst and if there is a compressive cyst then consideration of that point would be for decompression of the cyst/cyst to subarachnoid space shunting etc. even the possibility of demonstrating cyst.    With regard to her right upper extremity discomfort is been suggested by Dr. Oquendo that she be evaluated by Dr. Fry.  This referral has been made.  I would agree with that at this time.    And finally I recommended a book called \"Eight Steps to a Pain Free Back\" which is written by Kristi Faustin and available on [employer] for less than the price of a single physical therapy co-pay. This book outlines this injury that posture plays a very specific role in the development of the western civilizations epidemic of spine disease. It compares third world economy's and our first world relatively physically less challenging lifestyle. It points out that the postural habits of people in the third world are ingrained and that they suffer from minimal complaints with regard to back discomfort or neck discomfort despite the substantial increase in physical labor that they perform on a daily basis. Most importantly this book outlines an exercise program to strengthen core musculature and other muscles of posture both from the spine in the lumbar region and the neck. It has worked for me and may other pateints who have tried the exercise program.    Jessica was seen today for abnormal mri.    Diagnoses and all orders for this visit:    Abnormal MRI, thoracic spine      Return if symptoms worsen or fail to improve.                 "

## 2017-06-23 NOTE — TELEPHONE ENCOUNTER
----- Message from Rose Clancy sent at 6/23/2017  1:59 PM EDT -----  Contact: [phone]  Patient call again and wants to know why she haven't got a phone call yet. Please have  call her back about putting a order in.

## 2017-08-24 DIAGNOSIS — Z13.1 SCREENING FOR DIABETES MELLITUS: Primary | ICD-10-CM

## 2017-08-24 DIAGNOSIS — E31.9 POLYGLANDULAR DYSFUNCTION (HCC): ICD-10-CM

## 2017-08-24 DIAGNOSIS — Z13.6 SCREENING FOR ISCHEMIC HEART DISEASE: ICD-10-CM

## 2017-08-24 DIAGNOSIS — Z83.49 FAMILY HISTORY OF B12 DEFICIENCY: ICD-10-CM

## 2017-08-24 DIAGNOSIS — Z86.2 HISTORY OF ANEMIA: ICD-10-CM

## 2017-08-24 LAB
ALBUMIN SERPL-MCNC: 4.3 G/DL (ref 3.5–5.2)
ALBUMIN/GLOB SERPL: 1.7 G/DL
ALP SERPL-CCNC: 90 U/L (ref 39–117)
ALT SERPL-CCNC: 22 U/L (ref 1–33)
AST SERPL-CCNC: 19 U/L (ref 1–32)
BILIRUB SERPL-MCNC: 0.3 MG/DL (ref 0.1–1.2)
BUN SERPL-MCNC: 9 MG/DL (ref 8–23)
BUN/CREAT SERPL: 15 (ref 7–25)
CALCIUM SERPL-MCNC: 9.8 MG/DL (ref 8.6–10.5)
CHLORIDE SERPL-SCNC: 98 MMOL/L (ref 98–107)
CHOLEST SERPL-MCNC: 227 MG/DL (ref 0–200)
CO2 SERPL-SCNC: 30.4 MMOL/L (ref 22–29)
CREAT SERPL-MCNC: 0.6 MG/DL (ref 0.57–1)
ERYTHROCYTE [DISTWIDTH] IN BLOOD BY AUTOMATED COUNT: 16.4 % (ref 11.7–13)
FERRITIN SERPL-MCNC: 29.56 NG/ML (ref 13–150)
FOLATE SERPL-MCNC: 17.04 NG/ML (ref 4.78–24.2)
GLOBULIN SER CALC-MCNC: 2.6 GM/DL
GLUCOSE SERPL-MCNC: 105 MG/DL (ref 65–99)
HBA1C MFR BLD: 6.11 % (ref 4.8–5.6)
HCT VFR BLD AUTO: 46.5 % (ref 35.6–45.5)
HDLC SERPL-MCNC: 40 MG/DL (ref 40–60)
HGB BLD-MCNC: 14.4 G/DL (ref 11.9–15.5)
LDLC SERPL CALC-MCNC: 123 MG/DL (ref 0–100)
LDLC/HDLC SERPL: 3.09 {RATIO}
MCH RBC QN AUTO: 27.6 PG (ref 26.9–32)
MCHC RBC AUTO-ENTMCNC: 31 G/DL (ref 32.4–36.3)
MCV RBC AUTO: 89.3 FL (ref 80.5–98.2)
PLATELET # BLD AUTO: 221 10*3/MM3 (ref 140–500)
POTASSIUM SERPL-SCNC: 4.4 MMOL/L (ref 3.5–5.2)
PROT SERPL-MCNC: 6.9 G/DL (ref 6–8.5)
RBC # BLD AUTO: 5.21 10*6/MM3 (ref 3.9–5.2)
SODIUM SERPL-SCNC: 142 MMOL/L (ref 136–145)
TRIGL SERPL-MCNC: 318 MG/DL (ref 0–150)
VIT B12 SERPL-MCNC: 425 PG/ML (ref 211–946)
VLDLC SERPL CALC-MCNC: 63.6 MG/DL (ref 5–40)
WBC # BLD AUTO: 6.38 10*3/MM3 (ref 4.5–10.7)

## 2017-08-31 ENCOUNTER — OFFICE VISIT (OUTPATIENT)
Dept: FAMILY MEDICINE CLINIC | Facility: CLINIC | Age: 77
End: 2017-08-31

## 2017-08-31 VITALS
WEIGHT: 147 LBS | HEIGHT: 63 IN | OXYGEN SATURATION: 98 % | HEART RATE: 105 BPM | DIASTOLIC BLOOD PRESSURE: 80 MMHG | SYSTOLIC BLOOD PRESSURE: 144 MMHG | BODY MASS INDEX: 26.05 KG/M2 | RESPIRATION RATE: 16 BRPM

## 2017-08-31 DIAGNOSIS — I48.0 PAROXYSMAL ATRIAL FIBRILLATION (HCC): Primary | ICD-10-CM

## 2017-08-31 DIAGNOSIS — R79.0 LOW FERRITIN LEVEL: ICD-10-CM

## 2017-08-31 DIAGNOSIS — D50.9 IRON DEFICIENCY ANEMIA, UNSPECIFIED IRON DEFICIENCY ANEMIA TYPE: ICD-10-CM

## 2017-08-31 PROCEDURE — 99213 OFFICE O/P EST LOW 20 MIN: CPT | Performed by: FAMILY MEDICINE

## 2017-11-20 DIAGNOSIS — R79.89 ABNORMAL CBC: Primary | ICD-10-CM

## 2017-11-22 LAB
ERYTHROCYTE [DISTWIDTH] IN BLOOD BY AUTOMATED COUNT: 14.1 % (ref 11.7–13)
HBA1C MFR BLD: 5.7 % (ref 4.8–5.6)
HCT VFR BLD AUTO: 44.5 % (ref 35.6–45.5)
HGB BLD-MCNC: 14.1 G/DL (ref 11.9–15.5)
MCH RBC QN AUTO: 28.6 PG (ref 26.9–32)
MCHC RBC AUTO-ENTMCNC: 31.7 G/DL (ref 32.4–36.3)
MCV RBC AUTO: 90.3 FL (ref 80.5–98.2)
PLATELET # BLD AUTO: 250 10*3/MM3 (ref 140–500)
RBC # BLD AUTO: 4.93 10*6/MM3 (ref 3.9–5.2)
WBC # BLD AUTO: 8.18 10*3/MM3 (ref 4.5–10.7)

## 2018-05-09 ENCOUNTER — TELEPHONE (OUTPATIENT)
Dept: FAMILY MEDICINE CLINIC | Facility: CLINIC | Age: 78
End: 2018-05-09

## 2018-05-09 DIAGNOSIS — C80.1 CANCER (HCC): ICD-10-CM

## 2018-05-09 DIAGNOSIS — I48.0 PAROXYSMAL ATRIAL FIBRILLATION (HCC): ICD-10-CM

## 2018-05-09 DIAGNOSIS — Z13.1 SCREENING FOR DIABETES MELLITUS: ICD-10-CM

## 2018-05-09 DIAGNOSIS — E78.00 ELEVATED CHOLESTEROL: Primary | ICD-10-CM

## 2018-05-09 DIAGNOSIS — G45.1 HEMISPHERIC CAROTID ARTERY SYNDROME: ICD-10-CM

## 2018-05-09 DIAGNOSIS — R73.01 IMPAIRED FASTING GLUCOSE: ICD-10-CM

## 2018-05-09 NOTE — TELEPHONE ENCOUNTER
Yes. CMP, CBC with ferritin, HBA1C and FLP.   Please order.  ----- Message from Doug Mcdermott MA sent at 5/9/2018  3:06 PM EDT -----  Regarding: FW: Non-Urgent Medical Question  Contact: [phone]  Is this ok?  ----- Message -----  From: Jessica Davies  Sent: 5/9/2018   1:00 PM  [facility]  Subject: Non-Urgent Medical Question                      Dr. Worrell, i’m Coming in for blood work on Friday and I really would appreciate it if you would put on my chart for them to do a ferritin panel, check my triglycerides,and do an A1C.Thank you,  Jessica Davies

## 2018-05-11 DIAGNOSIS — Z12.39 BREAST CANCER SCREENING: Primary | ICD-10-CM

## 2018-05-11 LAB
ALBUMIN SERPL-MCNC: 4.3 G/DL (ref 3.5–5.2)
ALBUMIN/GLOB SERPL: 1.8 G/DL
ALP SERPL-CCNC: 83 U/L (ref 39–117)
ALT SERPL-CCNC: 21 U/L (ref 1–33)
AST SERPL-CCNC: 23 U/L (ref 1–32)
BASOPHILS # BLD AUTO: 0.03 10*3/MM3 (ref 0–0.2)
BASOPHILS NFR BLD AUTO: 0.4 % (ref 0–1.5)
BILIRUB SERPL-MCNC: 0.4 MG/DL (ref 0.1–1.2)
BUN SERPL-MCNC: 11 MG/DL (ref 8–23)
BUN/CREAT SERPL: 17.7 (ref 7–25)
CALCIUM SERPL-MCNC: 9.3 MG/DL (ref 8.6–10.5)
CHLORIDE SERPL-SCNC: 102 MMOL/L (ref 98–107)
CHOLEST SERPL-MCNC: 255 MG/DL (ref 0–200)
CO2 SERPL-SCNC: 29.1 MMOL/L (ref 22–29)
CREAT SERPL-MCNC: 0.62 MG/DL (ref 0.57–1)
EOSINOPHIL # BLD AUTO: 0.11 10*3/MM3 (ref 0–0.7)
EOSINOPHIL NFR BLD AUTO: 1.6 % (ref 0.3–6.2)
ERYTHROCYTE [DISTWIDTH] IN BLOOD BY AUTOMATED COUNT: 14.2 % (ref 11.7–13)
FERRITIN SERPL-MCNC: 29.22 NG/ML (ref 13–150)
GFR SERPLBLD CREATININE-BSD FMLA CKD-EPI: 113 ML/MIN/1.73
GFR SERPLBLD CREATININE-BSD FMLA CKD-EPI: 93 ML/MIN/1.73
GLOBULIN SER CALC-MCNC: 2.4 GM/DL
GLUCOSE SERPL-MCNC: 104 MG/DL (ref 65–99)
HCT VFR BLD AUTO: 44.4 % (ref 35.6–45.5)
HDLC SERPL-MCNC: 41 MG/DL (ref 40–60)
HGB BLD-MCNC: 14.1 G/DL (ref 11.9–15.5)
IMM GRANULOCYTES # BLD: 0.01 10*3/MM3 (ref 0–0.03)
IMM GRANULOCYTES NFR BLD: 0.1 % (ref 0–0.5)
LDLC SERPL CALC-MCNC: 159 MG/DL (ref 0–100)
LDLC/HDLC SERPL: 3.87 {RATIO}
LYMPHOCYTES # BLD AUTO: 3.5 10*3/MM3 (ref 0.9–4.8)
LYMPHOCYTES NFR BLD AUTO: 49.4 % (ref 19.6–45.3)
MCH RBC QN AUTO: 28.8 PG (ref 26.9–32)
MCHC RBC AUTO-ENTMCNC: 31.8 G/DL (ref 32.4–36.3)
MCV RBC AUTO: 90.8 FL (ref 80.5–98.2)
MONOCYTES # BLD AUTO: 0.49 10*3/MM3 (ref 0.2–1.2)
MONOCYTES NFR BLD AUTO: 6.9 % (ref 5–12)
NEUTROPHILS # BLD AUTO: 2.96 10*3/MM3 (ref 1.9–8.1)
NEUTROPHILS NFR BLD AUTO: 41.7 % (ref 42.7–76)
PLATELET # BLD AUTO: 241 10*3/MM3 (ref 140–500)
POTASSIUM SERPL-SCNC: 4.1 MMOL/L (ref 3.5–5.2)
PROT SERPL-MCNC: 6.7 G/DL (ref 6–8.5)
RBC # BLD AUTO: 4.89 10*6/MM3 (ref 3.9–5.2)
SODIUM SERPL-SCNC: 143 MMOL/L (ref 136–145)
TRIGL SERPL-MCNC: 276 MG/DL (ref 0–150)
VLDLC SERPL CALC-MCNC: 55.2 MG/DL (ref 5–40)
WBC # BLD AUTO: 7.09 10*3/MM3 (ref 4.5–10.7)

## 2018-05-12 LAB — HBA1C MFR BLD: 5.8 % (ref 4.8–5.6)

## 2018-05-14 ENCOUNTER — RESULTS ENCOUNTER (OUTPATIENT)
Dept: FAMILY MEDICINE CLINIC | Facility: CLINIC | Age: 78
End: 2018-05-14

## 2018-05-14 DIAGNOSIS — E78.00 ELEVATED CHOLESTEROL: ICD-10-CM

## 2018-05-14 DIAGNOSIS — R73.01 IMPAIRED FASTING GLUCOSE: ICD-10-CM

## 2018-05-14 DIAGNOSIS — G45.1 HEMISPHERIC CAROTID ARTERY SYNDROME: ICD-10-CM

## 2018-05-14 DIAGNOSIS — I48.0 PAROXYSMAL ATRIAL FIBRILLATION (HCC): ICD-10-CM

## 2018-05-14 DIAGNOSIS — Z13.1 SCREENING FOR DIABETES MELLITUS: ICD-10-CM

## 2018-05-14 DIAGNOSIS — C80.1 CANCER (HCC): ICD-10-CM

## 2018-07-27 ENCOUNTER — OFFICE VISIT (OUTPATIENT)
Dept: FAMILY MEDICINE CLINIC | Facility: CLINIC | Age: 78
End: 2018-07-27

## 2018-07-27 VITALS
DIASTOLIC BLOOD PRESSURE: 70 MMHG | BODY MASS INDEX: 25.69 KG/M2 | HEART RATE: 78 BPM | TEMPERATURE: 99 F | WEIGHT: 145 LBS | RESPIRATION RATE: 16 BRPM | SYSTOLIC BLOOD PRESSURE: 140 MMHG | OXYGEN SATURATION: 98 % | HEIGHT: 63 IN

## 2018-07-27 DIAGNOSIS — E11.9 CONTROLLED TYPE 2 DIABETES MELLITUS WITHOUT COMPLICATION, WITHOUT LONG-TERM CURRENT USE OF INSULIN (HCC): ICD-10-CM

## 2018-07-27 DIAGNOSIS — C80.1 CANCER (HCC): ICD-10-CM

## 2018-07-27 DIAGNOSIS — G25.81 RESTLESS LEGS SYNDROME (RLS): ICD-10-CM

## 2018-07-27 DIAGNOSIS — I48.0 PAROXYSMAL ATRIAL FIBRILLATION (HCC): ICD-10-CM

## 2018-07-27 DIAGNOSIS — J01.00 ACUTE NON-RECURRENT MAXILLARY SINUSITIS: Primary | ICD-10-CM

## 2018-07-27 PROCEDURE — 99214 OFFICE O/P EST MOD 30 MIN: CPT | Performed by: FAMILY MEDICINE

## 2018-07-27 RX ORDER — CLONAZEPAM 1 MG/1
1 TABLET ORAL 2 TIMES DAILY PRN
Qty: 60 TABLET | Refills: 0 | OUTPATIENT
Start: 2018-07-27 | End: 2018-07-27 | Stop reason: SDUPTHER

## 2018-07-27 RX ORDER — CIPROFLOXACIN 500 MG/1
500 TABLET, FILM COATED ORAL EVERY 12 HOURS SCHEDULED
Qty: 14 TABLET | Refills: 0 | Status: SHIPPED | OUTPATIENT
Start: 2018-07-27 | End: 2018-08-03

## 2018-07-27 RX ORDER — CLONAZEPAM 1 MG/1
1 TABLET ORAL 2 TIMES DAILY PRN
Qty: 60 TABLET | Refills: 0 | Status: SHIPPED | OUTPATIENT
Start: 2018-07-27 | End: 2018-12-14 | Stop reason: SDUPTHER

## 2018-07-27 NOTE — PROGRESS NOTES
Subjective   Jessica Davies is a 78 y.o. female.     History of Present Illness   Pat is here w/ c/o sore throat, rt ear pain, cough and congestion.  Denies fever.  She had Rt eye discharge but called opth for drops.  She has been ill about 7 days.  She has tried some otc decongestant and nasal spray.  She feels like she is gettting worse.  She has a hx of paroxysmal afib and is doing well on Eliquis.   She has a hx of breast cancer and is currently in remission.  She has a hx of RLS and takes the occasional Klonopin to help with symptoms.   She has diet controlled DM2 and is doing well with exercise and diet.   The following portions of the patient's history were reviewed and updated as appropriate: allergies, current medications, past medical history, past social history and problem list.    Review of Systems   Constitutional: Negative.    HENT: Positive for congestion, ear pain, sinus pain, sinus pressure, sore throat and tinnitus.    Eyes: Positive for discharge.   Respiratory: Positive for cough.    Cardiovascular: Negative.    Gastrointestinal: Negative.    Genitourinary: Negative.    Musculoskeletal: Negative.    Allergic/Immunologic: Negative.    Neurological: Negative.    Hematological: Negative.    Psychiatric/Behavioral: Negative.    All other systems reviewed and are negative.      Objective   Physical Exam   Constitutional: She is oriented to person, place, and time. She appears well-developed and well-nourished. No distress.   HENT:   Head: Normocephalic and atraumatic.   Right Ear: External ear normal.   Left Ear: External ear normal.   Nose: Nose normal.   Mouth/Throat: Oropharynx is clear and moist. No oropharyngeal exudate.   Eyes: Pupils are equal, round, and reactive to light. Conjunctivae and EOM are normal.   Neck: Normal range of motion.   Cardiovascular: Normal rate and regular rhythm.    Pulmonary/Chest: Effort normal and breath sounds normal. No stridor. No respiratory distress. She has  no wheezes.   Lymphadenopathy:     She has no cervical adenopathy.   Neurological: She is alert and oriented to person, place, and time.   Skin: Skin is warm and dry.   Nursing note and vitals reviewed.      Assessment/Plan   Jessica was seen today for uri.    Diagnoses and all orders for this visit:    Restless legs syndrome (RLS)  -     clonazePAM (KlonoPIN) 1 MG tablet; Take 1 tablet by mouth 2 (Two) Times a Day As Needed for Anxiety.    Cancer (CMS/HCC)  In remission and gets regular mammograms    Controlled type 2 diabetes mellitus without complication, without long-term current use of insulin (CMS/HCC)  Well controlled on diet and exercise .    Paroxysmal atrial fibrillation (CMS/HCC)  Followed by cardiology and on Eliquis.    Acute Sinus infection  She has allergies and tolerance issues with multiple antibiotics. We decided to try Cipro because she has tolerated this well in the past. I have advised on OTC meds for symptoms.   -     ciprofloxacin (CIPRO) 500 MG tablet; Take 1 tablet by mouth Every 12 (Twelve) Hours for 7 days.

## 2018-07-27 NOTE — PATIENT INSTRUCTIONS
I have started you on cipro 500 mg twice a day for one week.  You can take Mucinex  Or Robitussin twice a day with lots of fluids.

## 2018-10-09 ENCOUNTER — OFFICE VISIT (OUTPATIENT)
Dept: FAMILY MEDICINE CLINIC | Facility: CLINIC | Age: 78
End: 2018-10-09

## 2018-10-09 VITALS
WEIGHT: 144 LBS | OXYGEN SATURATION: 99 % | BODY MASS INDEX: 25.52 KG/M2 | DIASTOLIC BLOOD PRESSURE: 80 MMHG | SYSTOLIC BLOOD PRESSURE: 134 MMHG | HEART RATE: 71 BPM | HEIGHT: 63 IN | RESPIRATION RATE: 16 BRPM

## 2018-10-09 DIAGNOSIS — R30.0 DYSURIA: Primary | ICD-10-CM

## 2018-10-09 PROBLEM — I47.1 MULTIFOCAL ATRIAL TACHYCARDIA (HCC): Status: ACTIVE | Noted: 2017-09-11

## 2018-10-09 PROBLEM — I48.92 PAROXYSMAL ATRIAL FLUTTER (HCC): Status: ACTIVE | Noted: 2017-09-11

## 2018-10-09 PROBLEM — I47.19 MULTIFOCAL ATRIAL TACHYCARDIA: Status: ACTIVE | Noted: 2017-09-11

## 2018-10-09 LAB
BILIRUB BLD-MCNC: NEGATIVE MG/DL
CLARITY, POC: CLEAR
COLOR UR: YELLOW
GLUCOSE UR STRIP-MCNC: NEGATIVE MG/DL
KETONES UR QL: NEGATIVE
LEUKOCYTE EST, POC: ABNORMAL
NITRITE UR-MCNC: NEGATIVE MG/ML
PH UR: 8 [PH] (ref 5–8)
PROT UR STRIP-MCNC: NEGATIVE MG/DL
RBC # UR STRIP: NEGATIVE /UL
SP GR UR: 1 (ref 1–1.03)
UROBILINOGEN UR QL: NORMAL

## 2018-10-09 PROCEDURE — 81002 URINALYSIS NONAUTO W/O SCOPE: CPT | Performed by: FAMILY MEDICINE

## 2018-10-09 PROCEDURE — 99213 OFFICE O/P EST LOW 20 MIN: CPT | Performed by: FAMILY MEDICINE

## 2018-10-09 RX ORDER — CIPROFLOXACIN 250 MG/1
250 TABLET, FILM COATED ORAL 2 TIMES DAILY
Qty: 6 TABLET | Refills: 0 | Status: SHIPPED | OUTPATIENT
Start: 2018-10-09 | End: 2018-10-12

## 2018-10-09 NOTE — PROGRESS NOTES
Subjective   Jessica Davies is a 78 y.o. female.     History of Present Illness   Pt is here w/ c/o lower abd pain/pressure.  She cannot tell if she is having any burning due to chronic vaginal irritation.  She is c/o minor back pain, fatigue and abd bloating.  Denies fever.  She has taken no otc meds.  She recently had medication allergy testing and is not allergic to PCNs , floraquinolones .    The following portions of the patient's history were reviewed and updated as appropriate: allergies, current medications, past medical history, past social history, past surgical history and problem list.    Review of Systems   Gastrointestinal: Positive for abdominal distention and abdominal pain.   Genitourinary: Positive for pelvic pain.   All other systems reviewed and are negative.      Objective   Physical Exam   Constitutional: She is oriented to person, place, and time. She appears well-developed.   HENT:   Head: Normocephalic and atraumatic.   Eyes: Pupils are equal, round, and reactive to light. EOM are normal.   Cardiovascular: Normal rate, regular rhythm and normal heart sounds.    Pulmonary/Chest: Effort normal and breath sounds normal.   Neurological: She is alert and oriented to person, place, and time.   Skin: Skin is warm and dry.   Nursing note and vitals reviewed.      Assessment/Plan   Jessica was seen today for urinary tract infection.    Diagnoses and all orders for this visit:    Dysuria  -     POCT urinalysis dipstick, manual  -     ciprofloxacin (CIPRO) 250 MG tablet; Take 1 tablet by mouth 2 (Two) Times a Day for 3 days.  -     Urine Culture - Urine, Urine, Clean Catch    Possible UTI - sent for culture and will try an antibiotic.

## 2018-10-14 LAB
BACTERIA UR CULT: NORMAL
BACTERIA UR CULT: NORMAL

## 2018-12-14 DIAGNOSIS — G25.81 RESTLESS LEGS SYNDROME (RLS): ICD-10-CM

## 2018-12-17 RX ORDER — CLONAZEPAM 1 MG/1
TABLET ORAL
Qty: 60 TABLET | Refills: 0 | Status: SHIPPED | OUTPATIENT
Start: 2018-12-17 | End: 2019-10-01 | Stop reason: SDUPTHER

## 2018-12-26 DIAGNOSIS — Z01.89 ROUTINE LAB DRAW: ICD-10-CM

## 2018-12-26 DIAGNOSIS — E11.9 CONTROLLED TYPE 2 DIABETES MELLITUS WITHOUT COMPLICATION, UNSPECIFIED WHETHER LONG TERM INSULIN USE (HCC): ICD-10-CM

## 2018-12-26 DIAGNOSIS — E78.00 ELEVATED CHOLESTEROL: Primary | ICD-10-CM

## 2018-12-28 LAB
ALBUMIN SERPL-MCNC: 3.8 G/DL (ref 3.5–5.2)
ALBUMIN/GLOB SERPL: 1.3 G/DL
ALP SERPL-CCNC: 92 U/L (ref 39–117)
ALT SERPL-CCNC: 21 U/L (ref 1–33)
AST SERPL-CCNC: 25 U/L (ref 1–32)
BASOPHILS # BLD AUTO: 0.02 10*3/MM3 (ref 0–0.2)
BASOPHILS NFR BLD AUTO: 0.3 % (ref 0–1.5)
BILIRUB SERPL-MCNC: 0.2 MG/DL (ref 0.1–1.2)
BUN SERPL-MCNC: 13 MG/DL (ref 8–23)
BUN/CREAT SERPL: 21.3 (ref 7–25)
CALCIUM SERPL-MCNC: 9 MG/DL (ref 8.6–10.5)
CHLORIDE SERPL-SCNC: 101 MMOL/L (ref 98–107)
CHOLEST SERPL-MCNC: 239 MG/DL (ref 0–200)
CO2 SERPL-SCNC: 29.2 MMOL/L (ref 22–29)
CREAT SERPL-MCNC: 0.61 MG/DL (ref 0.57–1)
EOSINOPHIL # BLD AUTO: 0.14 10*3/MM3 (ref 0–0.7)
EOSINOPHIL NFR BLD AUTO: 2 % (ref 0.3–6.2)
ERYTHROCYTE [DISTWIDTH] IN BLOOD BY AUTOMATED COUNT: 15.5 % (ref 11.7–13)
GLOBULIN SER CALC-MCNC: 3 GM/DL
GLUCOSE SERPL-MCNC: 110 MG/DL (ref 65–99)
HBA1C MFR BLD: 6.01 % (ref 4.8–5.6)
HCT VFR BLD AUTO: 42.3 % (ref 35.6–45.5)
HDLC SERPL-MCNC: 45 MG/DL (ref 40–60)
HGB BLD-MCNC: 12.9 G/DL (ref 11.9–15.5)
IMM GRANULOCYTES # BLD: 0 10*3/MM3 (ref 0–0.03)
IMM GRANULOCYTES NFR BLD: 0 % (ref 0–0.5)
LDLC SERPL CALC-MCNC: 149 MG/DL (ref 0–100)
LDLC/HDLC SERPL: 3.3 {RATIO}
LYMPHOCYTES # BLD AUTO: 3.34 10*3/MM3 (ref 0.9–4.8)
LYMPHOCYTES NFR BLD AUTO: 47.6 % (ref 19.6–45.3)
MCH RBC QN AUTO: 27.6 PG (ref 26.9–32)
MCHC RBC AUTO-ENTMCNC: 30.5 G/DL (ref 32.4–36.3)
MCV RBC AUTO: 90.6 FL (ref 80.5–98.2)
MONOCYTES # BLD AUTO: 0.46 10*3/MM3 (ref 0.2–1.2)
MONOCYTES NFR BLD AUTO: 6.6 % (ref 5–12)
NEUTROPHILS # BLD AUTO: 3.05 10*3/MM3 (ref 1.9–8.1)
NEUTROPHILS NFR BLD AUTO: 43.5 % (ref 42.7–76)
PLATELET # BLD AUTO: 236 10*3/MM3 (ref 140–500)
POTASSIUM SERPL-SCNC: 4.4 MMOL/L (ref 3.5–5.2)
PROT SERPL-MCNC: 6.8 G/DL (ref 6–8.5)
RBC # BLD AUTO: 4.67 10*6/MM3 (ref 3.9–5.2)
SODIUM SERPL-SCNC: 139 MMOL/L (ref 136–145)
TRIGL SERPL-MCNC: 227 MG/DL (ref 0–150)
VLDLC SERPL CALC-MCNC: 45.4 MG/DL (ref 5–40)
WBC # BLD AUTO: 7.01 10*3/MM3 (ref 4.5–10.7)

## 2018-12-31 ENCOUNTER — RESULTS ENCOUNTER (OUTPATIENT)
Dept: FAMILY MEDICINE CLINIC | Facility: CLINIC | Age: 78
End: 2018-12-31

## 2018-12-31 DIAGNOSIS — Z01.89 ROUTINE LAB DRAW: ICD-10-CM

## 2018-12-31 DIAGNOSIS — E11.9 CONTROLLED TYPE 2 DIABETES MELLITUS WITHOUT COMPLICATION, UNSPECIFIED WHETHER LONG TERM INSULIN USE (HCC): ICD-10-CM

## 2018-12-31 DIAGNOSIS — E78.00 ELEVATED CHOLESTEROL: ICD-10-CM

## 2019-01-03 ENCOUNTER — OFFICE VISIT (OUTPATIENT)
Dept: FAMILY MEDICINE CLINIC | Facility: CLINIC | Age: 79
End: 2019-01-03

## 2019-01-03 VITALS
SYSTOLIC BLOOD PRESSURE: 128 MMHG | WEIGHT: 150 LBS | DIASTOLIC BLOOD PRESSURE: 74 MMHG | BODY MASS INDEX: 26.58 KG/M2 | RESPIRATION RATE: 16 BRPM | OXYGEN SATURATION: 97 % | HEIGHT: 63 IN | HEART RATE: 80 BPM

## 2019-01-03 DIAGNOSIS — F41.9 ANXIETY: ICD-10-CM

## 2019-01-03 DIAGNOSIS — Z00.00 ROUTINE GENERAL MEDICAL EXAMINATION AT A HEALTH CARE FACILITY: Primary | ICD-10-CM

## 2019-01-03 DIAGNOSIS — E11.9 CONTROLLED TYPE 2 DIABETES MELLITUS WITHOUT COMPLICATION, WITHOUT LONG-TERM CURRENT USE OF INSULIN (HCC): ICD-10-CM

## 2019-01-03 DIAGNOSIS — C80.1 CANCER (HCC): ICD-10-CM

## 2019-01-03 DIAGNOSIS — I48.92 PAROXYSMAL ATRIAL FLUTTER (HCC): ICD-10-CM

## 2019-01-03 PROCEDURE — G0439 PPPS, SUBSEQ VISIT: HCPCS | Performed by: FAMILY MEDICINE

## 2019-01-03 PROCEDURE — 99213 OFFICE O/P EST LOW 20 MIN: CPT | Performed by: FAMILY MEDICINE

## 2019-01-03 RX ORDER — ESTRADIOL 0.1 MG/G
CREAM VAGINAL
COMMUNITY
End: 2021-05-13

## 2019-01-03 RX ORDER — DILTIAZEM HYDROCHLORIDE 120 MG/1
120 CAPSULE, COATED, EXTENDED RELEASE ORAL DAILY
COMMUNITY
Start: 2018-12-27 | End: 2019-03-27

## 2019-01-03 NOTE — PROGRESS NOTES
Medicare Subsequent Wellness Visit  Subjective   History of Present Illness    Jessica Davies is a 78 y.o. female who presents for an Medicare Wellness Visit. In addition, we addressed the following health issues:  She has a hx of afib and is followed by cardiology. She is on a monitor today.  She is still taking Eliquis.   She has diet controlled DM2 and is well managed.  She has anxiety and is well managed on Clonazepam. She is taking this only occasionally and feels that it helps her get through her day.   .     PMH, PSH, SocHx, FamHx, Allergies, and Medications: Reviewed and updated in the history section of chart.  Family History   Problem Relation Age of Onset   • Early death Mother    • Alcohol abuse Mother    • Asthma Mother    • Birth defects Mother    • Heart attack Mother    • Heart failure Mother    • Cancer Father         Prostate   • Heart disease Father    • Diabetes Father    • Heart attack Father    • Prostate cancer Father 70   • Hyperlipidemia Sister    • Diabetes Paternal Grandmother    • Diabetes Paternal Grandfather    • Heart failure Maternal Aunt    • Brain cancer Maternal Aunt    • Asthma Paternal Aunt        Social History     Social History Narrative    Lives at home with        Allergies   Allergen Reactions   • Rivaroxaban      Tongue tingling   • Azithromycin Palpitations   • Cholestatin Palpitations   • Sulfamethoxazole-Trimethoprim Rash       Outpatient Medications Prior to Visit   Medication Sig Dispense Refill   • diltiaZEM CD (CARDIZEM CD) 120 MG 24 hr capsule Take 120 mg by mouth Daily.     • acetaminophen (TYLENOL) 500 MG tablet Take 500 mg by mouth Every 6 (Six) Hours As Needed for Mild Pain (1-3).     • apixaban (ELIQUIS) 5 MG tablet tablet Take 5 mg by mouth.     • clonazePAM (KlonoPIN) 1 MG tablet TAKE 1 TABLET BY MOUTH TWICE DAILY AS NEEDED FOR ANXIETY 60 tablet 0   • estradiol (ESTRACE) 0.1 MG/GM vaginal cream Insert  into the vagina.     • Probiotic Product  (PROBIOTIC ADVANCED PO) Take  by mouth.       No facility-administered medications prior to visit.         Patient Active Problem List   Diagnosis   • Cancer (CMS/HCC)   • Malignant neoplasm of breast (CMS/HCC)   • Spinal stenosis of cervical region   • Congenital absence of fingers or toes   • Osteopenia   • Diabetes mellitus type 2, controlled, without complications (CMS/HCC)   • Herpes   • Elevated cholesterol   • Osteoarthritis of lumbar spine   • Kyphoscoliosis and scoliosis   • Hemispheric carotid artery syndrome   • Restless legs syndrome (RLS)   • Abnormal MRI, thoracic spine   • Paroxysmal atrial fibrillation (CMS/HCC)   • Dyspnea on exertion   • Multifocal atrial tachycardia (CMS/HCC)   • Paroxysmal atrial flutter (CMS/HCC)         Patient Care Team:  Mike Worrell MD as PCP - General (Family Medicine)  Mele Chapa MD as Consulting Physician (Cardiology)  Health Habits:  Current Diet: Well balanced diet  Dental Exam. UTD  Eye Exam. UTD  Exercise:yes  Current exercise activities include: elliptical and treadmill    Recent Hospitalizations:  none    Age-appropriate Screening Schedule:  Refer to the list below for future screening recommendations based on patient's age. Orders for these recommended tests are listed in the plan section. The patient has been provided with a written plan.    Health Maintenance   Topic Date Due   • HEPATITIS A VACCINE ADULT (1 of 2) 07/26/1958   • URINE MICROALBUMIN  12/22/2017   • DXA SCAN  02/07/2019   • HEMOGLOBIN A1C  06/28/2019   • LIPID PANEL  12/28/2019   • MEDICARE ANNUAL WELLNESS  01/03/2020   • MAMMOGRAM  05/16/2020   • COLONOSCOPY  10/01/2022   • TDAP/TD VACCINES (2 - Td) 12/22/2026   • INFLUENZA VACCINE  Completed   • PNEUMOCOCCAL VACCINES (65+ LOW/MEDIUM RISK)  Completed   • ZOSTER VACCINE  Addressed       Depression Screen:   PHQ-2/PHQ-9 Depression Screening 1/3/2019   Little interest or pleasure in doing things 0   Feeling down, depressed, or hopeless 0    Trouble falling or staying asleep, or sleeping too much -   Feeling tired or having little energy -   Poor appetite or overeating -   Feeling bad about yourself - or that you are a failure or have let yourself or your family down -   Trouble concentrating on things, such as reading the newspaper or watching television -   Moving or speaking so slowly that other people could have noticed. Or the opposite - being so fidgety or restless that you have been moving around a lot more than usual -   Thoughts that you would be better off dead, or of hurting yourself in some way -   Total Score 0       Functional and Cognitive Screening:  Functional & Cognitive Status 1/3/2019   Do you have difficulty preparing food and eating? No   Do you have difficulty bathing yourself, getting dressed or grooming yourself? No   Do you have difficulty using the toilet? No   Do you have difficulty moving around from place to place? No   Do you have trouble with steps or getting out of a bed or a chair? No   In the past year have you fallen or experienced a near fall? No   Current Diet Well Balanced Diet   Dental Exam Up to date   Eye Exam Up to date   Exercise (times per week) 3 times per week   Current Exercise Activities Include (No Data)   Do you need help using the phone?  No   Are you deaf or do you have serious difficulty hearing?  No   Do you need help with transportation? No   Do you need help shopping? No   Do you need help preparing meals?  No   Do you need help with housework?  Yes   Do you need help with laundry? No   Do you need help taking your medications? -   Do you need help managing money? No   Do you ever drive or ride in a car without wearing a seat belt? No   Have you felt unusual stress, anger or loneliness in the last month? No   Who do you live with? Spouse   If you need help, do you have trouble finding someone available to you? No   Have you been bothered in the last four weeks by sexual problems? No   Do you  "have difficulty concentrating, remembering or making decisions? No     Does the patient have evidence of cognitive impairment? none      Review of Systems   All other systems reviewed and are negative.      Objective     Vitals:    19 1112   BP: 128/74   Pulse: 80   Resp: 16   SpO2: 97%   Weight: 68 kg (150 lb)   Height: 160 cm (63\")       Body mass index is 26.57 kg/m².    PHYSICAL EXAM  Vitals reviewed and on chart.  HEENT: PERRLA, EOMI. Oral mucosa moist,   No LAD.  CV: RRR, no murmurs, rubs, clicks or gallops  LUNGS: CTA bilaterally  EXT: No edema, FROM in bilateral upper and lower ext  NEURO: CN II - XII grossly intact        ASSESSMENT AND PLAN      Problem List Items Addressed This Visit        Cardiovascular and Mediastinum    Paroxysmal atrial flutter (CMS/HCC)  Doing well and followed by cardiology    Relevant Medications    diltiaZEM CD (CARDIZEM CD) 120 MG 24 hr capsule       Endocrine    Diabetes mellitus type 2, controlled, without complications (CMS/HCC)       Other    Cancer (CMS/HCC)  She has a past hx of breast cancer and is doing well. Regular mammograms  Have been normal.      Other Visit Diagnoses     Routine general medical examination at a health care facility    -  Primary          Orders:  No orders of the defined types were placed in this encounter.      Follow Up:  Return in about 6 months (around 7/3/2019) for Recheck.   Patient Instructions       Medicare Wellness  Personal Prevention Plan of Service   Please bring a urine sample back.   Please come back in 6 months for re-check and CBC and HBA1C and Ferritin   Date of Office Visit:  2019  Encounter Provider:  Mike Worrell MD  Place of Service:  [facility]  Patient Name: Jessica Davies  :  1940    As part of the Medicare Wellness portion of your visit today, we are providing you with this personalized preventive plan of services (PPPS). This plan is based upon recommendations of " the [place] Preventive Services Task Force (USPSTF) and the Advisory Committee on Immunization Practices (ACIP).    This lists the preventive care services that should be considered, and provides dates of when you are due. Items listed as completed are up-to-date and do not require any further intervention.    Health Maintenance   Topic Date Due   • HEPATITIS A VACCINE ADULT (1 of 2) 07/26/1958   • URINE MICROALBUMIN  12/22/2017   • DXA SCAN  02/07/2019   • HEMOGLOBIN A1C  06/28/2019   • LIPID PANEL  12/28/2019   • MEDICARE ANNUAL WELLNESS  01/03/2020   • MAMMOGRAM  05/16/2020   • COLONOSCOPY  10/01/2022   • TDAP/TD VACCINES (2 - Td) 12/22/2026   • INFLUENZA VACCINE  Completed   • PNEUMOCOCCAL VACCINES (65+ LOW/MEDIUM RISK)  Completed   • ZOSTER VACCINE  Addressed       No orders of the defined types were placed in this encounter.      Return in about 1 year (around 1/3/2020) for Annual physical.             ADVANCED DIRECTIVES: Advised    An After Visit Summary and PPPS with all of these plans were given to the patient.

## 2019-01-03 NOTE — PATIENT INSTRUCTIONS
Medicare Wellness  Personal Prevention Plan of Service   Please bring a urine sample back.   Please come back in 6 months for re-check and CBC and HBA1C and Ferritin   Date of Office Visit:  2019  Encounter Provider:  Mike Worrell MD  Place of Service:  [facility]  Patient Name: Jessica Davies  :  1940    As part of the Medicare Wellness portion of your visit today, we are providing you with this personalized preventive plan of services (PPPS). This plan is based upon recommendations of the [place] Preventive Services Task Force (USPSTF) and the Advisory Committee on Immunization Practices (ACIP).    This lists the preventive care services that should be considered, and provides dates of when you are due. Items listed as completed are up-to-date and do not require any further intervention.    Health Maintenance   Topic Date Due   • HEPATITIS A VACCINE ADULT (1 of 2) 1958   • URINE MICROALBUMIN  2017   • DXA SCAN  2019   • HEMOGLOBIN A1C  2019   • LIPID PANEL  2019   • MEDICARE ANNUAL WELLNESS  2020   • MAMMOGRAM  2020   • COLONOSCOPY  10/01/2022   • TDAP/TD VACCINES (2 - Td) 2026   • INFLUENZA VACCINE  Completed   • PNEUMOCOCCAL VACCINES (65+ LOW/MEDIUM RISK)  Completed   • ZOSTER VACCINE  Addressed       No orders of the defined types were placed in this encounter.      Return in about 1 year (around 1/3/2020) for Annual physical.

## 2019-01-04 LAB
ALBUMIN/CREAT UR: 6.9 MG/G CREAT (ref 0–30)
CREAT UR-MCNC: 121 MG/DL
MICROALBUMIN UR-MCNC: 8.4 UG/ML

## 2019-02-13 ENCOUNTER — TELEPHONE (OUTPATIENT)
Dept: MAMMOGRAPHY | Facility: CLINIC | Age: 79
End: 2019-02-13

## 2019-02-13 NOTE — TELEPHONE ENCOUNTER
Dr Carrol Mckeon [phone]:   She is seeing this pt and wants to prescribe her some Vaginal DHEA suppositories, but wants to make sure you are ok with this.  She is aware you are performing surgery all day.  Is it ok for her to write this for the pt.  Past history of breast cancer.

## 2019-05-03 ENCOUNTER — OFFICE VISIT (OUTPATIENT)
Dept: FAMILY MEDICINE CLINIC | Facility: CLINIC | Age: 79
End: 2019-05-03

## 2019-05-03 VITALS
BODY MASS INDEX: 25.16 KG/M2 | DIASTOLIC BLOOD PRESSURE: 78 MMHG | HEIGHT: 63 IN | WEIGHT: 142 LBS | OXYGEN SATURATION: 98 % | SYSTOLIC BLOOD PRESSURE: 122 MMHG | HEART RATE: 79 BPM

## 2019-05-03 DIAGNOSIS — J06.9 ACUTE URI: Primary | ICD-10-CM

## 2019-05-03 PROCEDURE — 99213 OFFICE O/P EST LOW 20 MIN: CPT | Performed by: FAMILY MEDICINE

## 2019-05-03 RX ORDER — DILTIAZEM HYDROCHLORIDE 120 MG/1
CAPSULE, COATED, EXTENDED RELEASE ORAL
Refills: 3 | COMMUNITY
Start: 2019-04-01 | End: 2021-01-26

## 2019-05-03 NOTE — PROGRESS NOTES
Subjective   Jessica Davies is a 78 y.o. female.     History of Present Illness     Jessica is here today for a cough and congestion. She reports that her allergies started bothering her last week and she took Zyertec, Mucinex and Tylenol.   She has had a cough and headache , but this is subsided. She is doing pretty well on OTC meds,     The following portions of the patient's history were reviewed and updated as appropriate: allergies, current medications, past medical history, past social history and problem list.    Review of Systems   Constitutional: Negative.    HENT: Positive for congestion.    Eyes: Negative.    Respiratory: Positive for cough.    Cardiovascular: Negative.    Gastrointestinal: Negative.    Genitourinary: Negative.    Neurological: Negative.    Psychiatric/Behavioral: Negative.           Objective   Physical Exam   Constitutional: She is oriented to person, place, and time. She appears well-developed and well-nourished. No distress.   HENT:   Head: Normocephalic and atraumatic.   Right Ear: External ear normal.   Left Ear: External ear normal.   Nose: Nose normal.   Mouth/Throat: Oropharynx is clear and moist. No oropharyngeal exudate.   Eyes: Conjunctivae and EOM are normal. Pupils are equal, round, and reactive to light.   Neck: Normal range of motion.   Cardiovascular: Normal rate and regular rhythm.   Pulmonary/Chest: Effort normal and breath sounds normal. No stridor. No respiratory distress. She has no wheezes.   Lymphadenopathy:     She has no cervical adenopathy.   Neurological: She is alert and oriented to person, place, and time.   Skin: Skin is warm and dry. No rash noted.   Nursing note and vitals reviewed.      Assessment/Plan   Jessica was seen today for cough.    Diagnoses and all orders for this visit:    Acute URI    .  Patient Instructions   Continue Mucinex, using your Chiara Pot, fluids and rest. Tylenol as needed.

## 2019-07-05 DIAGNOSIS — R73.01 IMPAIRED FASTING GLUCOSE: ICD-10-CM

## 2019-07-05 DIAGNOSIS — Z83.49 FAMILY HISTORY OF B12 DEFICIENCY: ICD-10-CM

## 2019-07-05 DIAGNOSIS — R79.0 LOW FERRITIN LEVEL: Primary | ICD-10-CM

## 2019-07-05 DIAGNOSIS — Z13.1 SCREENING FOR DIABETES MELLITUS: ICD-10-CM

## 2019-07-05 DIAGNOSIS — Z13.6 SCREENING FOR ISCHEMIC HEART DISEASE: ICD-10-CM

## 2019-07-05 DIAGNOSIS — D50.9 IRON DEFICIENCY ANEMIA, UNSPECIFIED IRON DEFICIENCY ANEMIA TYPE: ICD-10-CM

## 2019-07-08 ENCOUNTER — RESULTS ENCOUNTER (OUTPATIENT)
Dept: FAMILY MEDICINE CLINIC | Facility: CLINIC | Age: 79
End: 2019-07-08

## 2019-07-08 DIAGNOSIS — R79.0 LOW FERRITIN LEVEL: ICD-10-CM

## 2019-07-08 DIAGNOSIS — D50.9 IRON DEFICIENCY ANEMIA, UNSPECIFIED IRON DEFICIENCY ANEMIA TYPE: ICD-10-CM

## 2019-07-08 DIAGNOSIS — R73.01 IMPAIRED FASTING GLUCOSE: ICD-10-CM

## 2019-07-08 DIAGNOSIS — Z83.49 FAMILY HISTORY OF B12 DEFICIENCY: ICD-10-CM

## 2019-07-08 DIAGNOSIS — Z13.1 SCREENING FOR DIABETES MELLITUS: ICD-10-CM

## 2019-07-08 DIAGNOSIS — Z13.6 SCREENING FOR ISCHEMIC HEART DISEASE: ICD-10-CM

## 2019-07-09 LAB
ALBUMIN SERPL-MCNC: 4.5 G/DL (ref 3.5–5.2)
ALBUMIN/GLOB SERPL: 2 G/DL
ALP SERPL-CCNC: 90 U/L (ref 39–117)
ALT SERPL-CCNC: 17 U/L (ref 1–33)
AST SERPL-CCNC: 18 U/L (ref 1–32)
BILIRUB SERPL-MCNC: 0.2 MG/DL (ref 0.2–1.2)
BUN SERPL-MCNC: 7 MG/DL (ref 8–23)
BUN/CREAT SERPL: 12.1 (ref 7–25)
CALCIUM SERPL-MCNC: 9.3 MG/DL (ref 8.6–10.5)
CHLORIDE SERPL-SCNC: 101 MMOL/L (ref 98–107)
CHOLEST SERPL-MCNC: 215 MG/DL (ref 0–200)
CO2 SERPL-SCNC: 28.2 MMOL/L (ref 22–29)
CREAT SERPL-MCNC: 0.58 MG/DL (ref 0.57–1)
FERRITIN SERPL-MCNC: 20.4 NG/ML (ref 13–150)
FOLATE SERPL-MCNC: >20 NG/ML (ref 4.78–24.2)
GLOBULIN SER CALC-MCNC: 2.3 GM/DL
GLUCOSE SERPL-MCNC: 106 MG/DL (ref 65–99)
HBA1C MFR BLD: 6.1 % (ref 4.8–5.6)
HDLC SERPL-MCNC: 41 MG/DL (ref 40–60)
LDLC SERPL CALC-MCNC: 123 MG/DL (ref 0–100)
POTASSIUM SERPL-SCNC: 4.2 MMOL/L (ref 3.5–5.2)
PROT SERPL-MCNC: 6.8 G/DL (ref 6–8.5)
SODIUM SERPL-SCNC: 142 MMOL/L (ref 136–145)
TRIGL SERPL-MCNC: 255 MG/DL (ref 0–150)
VIT B12 SERPL-MCNC: 331 PG/ML (ref 211–946)
VLDLC SERPL CALC-MCNC: 51 MG/DL

## 2019-07-10 ENCOUNTER — OFFICE VISIT (OUTPATIENT)
Dept: FAMILY MEDICINE CLINIC | Facility: CLINIC | Age: 79
End: 2019-07-10

## 2019-07-10 VITALS
SYSTOLIC BLOOD PRESSURE: 124 MMHG | RESPIRATION RATE: 16 BRPM | OXYGEN SATURATION: 98 % | BODY MASS INDEX: 25.52 KG/M2 | DIASTOLIC BLOOD PRESSURE: 70 MMHG | HEART RATE: 75 BPM | WEIGHT: 144 LBS | HEIGHT: 63 IN

## 2019-07-10 DIAGNOSIS — F41.9 ANXIETY: Primary | ICD-10-CM

## 2019-07-10 DIAGNOSIS — I48.0 PAROXYSMAL ATRIAL FIBRILLATION (HCC): ICD-10-CM

## 2019-07-10 DIAGNOSIS — E11.9 CONTROLLED TYPE 2 DIABETES MELLITUS WITHOUT COMPLICATION, UNSPECIFIED WHETHER LONG TERM INSULIN USE (HCC): ICD-10-CM

## 2019-07-10 DIAGNOSIS — M47.26 OSTEOARTHRITIS OF SPINE WITH RADICULOPATHY, LUMBAR REGION: ICD-10-CM

## 2019-07-10 PROCEDURE — 99214 OFFICE O/P EST MOD 30 MIN: CPT | Performed by: FAMILY MEDICINE

## 2019-07-10 NOTE — PROGRESS NOTES
Subjective   Jessica Davies is a 78 y.o. female.     History of Present Illness   Pat is here for 6 mo follow up.  She is c/o back and leg pain.  She states nothing seems to make it better and seems to be getting worse. She has a hx of degenerative disc disease. She feels like this is limiting her ability to exercise. She also has a hx of scoliosis. She has done PT in the past but it has been several years.   DMII - CMP/FLP and HBA1C/ labs checked and on the chart today. Labs were reviewd..She  is diet controlled  without problem.She  is on not ACE/ARB because she requires diltiazem to manage Afib. B/P is well controlled.  Cholesterol is well controlled.She cannot tolerate statins..  Eye exam is UTD.  Dental exam UTD.  I have advised regular exercise and reduced carbohydrates in diet.  Follow up in 6 months  She has a hx of Afib and is followed by cardiology and she is doing well.       She states she takes he Klonipen about once every 2-3 mos.I fill this for her.  HARRISON and rohith are on the chart and UTD.    The following portions of the patient's history were reviewed and updated as appropriate: allergies, current medications, past family history, past medical history, past social history, past surgical history and problem list.    Review of Systems   Musculoskeletal: Positive for back pain.   All other systems reviewed and are negative.      Objective   Physical Exam   Constitutional: She is oriented to person, place, and time. She appears well-developed and well-nourished.   HENT:   Head: Normocephalic and atraumatic.   Eyes: EOM are normal. Pupils are equal, round, and reactive to light.   Neck: Normal range of motion.   Cardiovascular: Normal rate and regular rhythm.   No murmur heard.  Pulmonary/Chest: Effort normal and breath sounds normal.   Musculoskeletal: Normal range of motion. She exhibits deformity. She exhibits no edema.   Left arm and hand are atrophic.   Neurological: She is alert and  oriented to person, place, and time.   Skin: Skin is warm and dry. No rash noted.   Psychiatric: She has a normal mood and affect. Her behavior is normal. Judgment and thought content normal.   Nursing note and vitals reviewed.        Assessment/Plan   Diagnoses and all orders for this visit:    Anxiety  This is a chronic problem that has been well managed on current medications. Will refill as needed.     Controlled type 2 diabetes mellitus without complication, unspecified whether long term insulin use (CMS/[facility])  She is managing well with diet control and mild exercise.     Osteoarthritis of spine with radiculopathy, lumbar region  I have encouraged her to try PT again. She has mild radiculopathy and I don't think this could be corrected with surgery.   -     Ambulatory Referral to Physical Therapy Evaluate and treat    Paroxysmal atrial fibrillation (CMS/[facility])  She is in regular rhythm today and taking meds as prescribed, following up with cardiology.

## 2019-07-18 NOTE — PATIENT INSTRUCTIONS
I have referred you to PT to see if that helps with your back and leg pain.    Other (Free Text): Discussed treatment options with the patient. Patient chose Mohs over treating with ED&C, or Topical medication Detail Level: Zone Note Text (......Xxx Chief Complaint.): This diagnosis correlates with the

## 2019-10-01 DIAGNOSIS — G25.81 RESTLESS LEGS SYNDROME (RLS): ICD-10-CM

## 2019-10-02 RX ORDER — CLONAZEPAM 1 MG/1
TABLET ORAL
Qty: 60 TABLET | Refills: 0 | Status: SHIPPED | OUTPATIENT
Start: 2019-10-02 | End: 2020-02-22 | Stop reason: SDUPTHER

## 2020-02-22 DIAGNOSIS — G25.81 RESTLESS LEGS SYNDROME (RLS): ICD-10-CM

## 2020-02-24 ENCOUNTER — TELEPHONE (OUTPATIENT)
Dept: FAMILY MEDICINE CLINIC | Facility: CLINIC | Age: 80
End: 2020-02-24

## 2020-02-24 RX ORDER — CLONAZEPAM 1 MG/1
1 TABLET ORAL 2 TIMES DAILY PRN
Qty: 60 TABLET | Refills: 0 | Status: SHIPPED | OUTPATIENT
Start: 2020-02-24 | End: 2020-12-04 | Stop reason: SDUPTHER

## 2020-02-24 NOTE — TELEPHONE ENCOUNTER
Script cancelled at [facility] and called into [facility],    Pt has been informed

## 2020-02-24 NOTE — TELEPHONE ENCOUNTER
Pt called saying that refill of clonazePAM (KlonoPIN) 1 MG tablet was sent to the wrong pharmacy. It needs to be sent to [facility]. Pt says that she is completely out of the medication.      [facility]

## 2020-12-04 ENCOUNTER — TELEMEDICINE (OUTPATIENT)
Dept: FAMILY MEDICINE CLINIC | Facility: CLINIC | Age: 80
End: 2020-12-04

## 2020-12-04 VITALS — HEART RATE: 75 BPM | DIASTOLIC BLOOD PRESSURE: 83 MMHG | SYSTOLIC BLOOD PRESSURE: 155 MMHG

## 2020-12-04 DIAGNOSIS — G25.81 RESTLESS LEGS SYNDROME (RLS): ICD-10-CM

## 2020-12-04 DIAGNOSIS — M54.50 CHRONIC LOW BACK PAIN WITHOUT SCIATICA, UNSPECIFIED BACK PAIN LATERALITY: ICD-10-CM

## 2020-12-04 DIAGNOSIS — G89.29 CHRONIC LOW BACK PAIN WITHOUT SCIATICA, UNSPECIFIED BACK PAIN LATERALITY: ICD-10-CM

## 2020-12-04 DIAGNOSIS — D50.9 IRON DEFICIENCY ANEMIA, UNSPECIFIED IRON DEFICIENCY ANEMIA TYPE: ICD-10-CM

## 2020-12-04 DIAGNOSIS — F41.9 ANXIETY: ICD-10-CM

## 2020-12-04 DIAGNOSIS — E11.9 CONTROLLED TYPE 2 DIABETES MELLITUS WITHOUT COMPLICATION, UNSPECIFIED WHETHER LONG TERM INSULIN USE (HCC): Primary | ICD-10-CM

## 2020-12-04 PROCEDURE — 99214 OFFICE O/P EST MOD 30 MIN: CPT | Performed by: FAMILY MEDICINE

## 2020-12-04 RX ORDER — TRAMADOL HYDROCHLORIDE 50 MG/1
100 TABLET ORAL 2 TIMES DAILY
COMMUNITY
Start: 2020-09-09 | End: 2020-12-29 | Stop reason: SDUPTHER

## 2020-12-04 RX ORDER — METOPROLOL SUCCINATE 25 MG/1
25 TABLET, EXTENDED RELEASE ORAL DAILY
COMMUNITY
Start: 2020-07-13 | End: 2021-05-13 | Stop reason: SDUPTHER

## 2020-12-04 RX ORDER — CLONAZEPAM 1 MG/1
1 TABLET ORAL 2 TIMES DAILY PRN
Qty: 60 TABLET | Refills: 0 | Status: SHIPPED | OUTPATIENT
Start: 2020-12-04 | End: 2021-04-30

## 2020-12-04 NOTE — PROGRESS NOTES
Subjective   Jessica Davies is a 80 y.o. female.   Anxiety (med refill)    History of Present Illness   Pt is having a video visit today questing a refill of clonazepam..She has an annual wellness visit scheduled for January.  He has been taking clonazepam only occasionally for several years to help treat restless legs and occasional anxiety.  This medication has been helpful on many occasions and she has been very careful about her use.  Recently started taking tramadol for back pain and restless legs.  This was started by Dr. Mercer, a pain management specialist.  She also had injections in her back performed by Dr. Mercer.  She is asking if I will assume prescribing tramadol for her as needed.  She does not anticipate going back to pain management.  She feels that this pain medication gives her good relief from her back pain.    He is also asking me to go ahead and order her labs so she can come in ahead of her visit for her physical.      The following portions of the patient's history were reviewed and updated as appropriate: allergies, current medications, past family history, past medical history, past social history, past surgical history and problem list.    Review of Systems   Musculoskeletal: Positive for back pain.   Psychiatric/Behavioral: Positive for sleep disturbance.   All other systems reviewed and are negative.      Objective   Physical Exam   Constitutional: She appears well-developed and well-nourished.   HENT:   Head: Normocephalic and atraumatic.   Eyes: Pupils are equal, round, and reactive to light. Conjunctivae and EOM are normal.   Neck: Neck normal appearance.  Pulmonary/Chest: Effort normal.  No respiratory distress.  Abdominal: Abdomen appears normal.   Musculoskeletal: Normal range of motion.   Neurological: She is alert.   Skin: No rash noted.   Psychiatric: She has a normal mood and affect.   Vitals reviewed.        Assessment/Plan   Problem List Items Addressed This Visit         Endocrine    Diabetes mellitus type 2, controlled, without complications (CMS/[facility]) - Primary    Relevant Orders    Hemoglobin A1c    Microalbumin / Creatinine Urine Ratio - Urine, Clean Catch    Comprehensive Metabolic Panel    Lipid Panel With / Chol / HDL Ratio       Hematopoietic and Hemostatic    Iron deficiency anemia    Relevant Orders    Iron and TIBC    Ferritin    CBC (No Diff)       Other    Restless legs syndrome (RLS)    Relevant Medications    clonazePAM (KlonoPIN) 1 MG tablet    Anxiety  The patient has read and signed the [facility] Controlled Substance Contract.  I will continue to see patient for regular follow up appointments.  She are well controlled on current medication.  HARRISON has been reviewed by me and is updated every 3 months. The patient is aware of the potential for addiction and dependence.          Other Visit Diagnoses     Chronic low back pain without sciatica, unspecified back pain laterality     I have advised Pat that I will refill her tramadol in the future for her.               No follow-ups on file.

## 2020-12-28 ENCOUNTER — PATIENT MESSAGE (OUTPATIENT)
Dept: FAMILY MEDICINE CLINIC | Facility: CLINIC | Age: 80
End: 2020-12-28

## 2020-12-28 NOTE — TELEPHONE ENCOUNTER
From: Jessica Davies  To: Mike Worrell MD  Sent: 12/28/2020 4:35 PM EST  Subject: Prescription Question    , last time we talked you said you would write me a prescription for Tramadol. Since the one I’m using was written by Dr. Mercer I would rather have you write it up for me since I don’t think I’ll be seeing him for awhile. I’m out of them now so if you could do this for me I would appreciate it. We can talk more about this if we need to when I see you in a few weeks. Thanks, Jessica Davies

## 2020-12-29 DIAGNOSIS — M54.50 CHRONIC LOW BACK PAIN WITHOUT SCIATICA, UNSPECIFIED BACK PAIN LATERALITY: Primary | ICD-10-CM

## 2020-12-29 DIAGNOSIS — G89.29 CHRONIC LOW BACK PAIN WITHOUT SCIATICA, UNSPECIFIED BACK PAIN LATERALITY: Primary | ICD-10-CM

## 2020-12-29 RX ORDER — TRAMADOL HYDROCHLORIDE 50 MG/1
100 TABLET ORAL 2 TIMES DAILY
Qty: 60 TABLET | Refills: 1 | Status: SHIPPED | OUTPATIENT
Start: 2020-12-29 | End: 2021-01-26

## 2021-01-23 LAB
ALBUMIN SERPL-MCNC: 4.3 G/DL (ref 3.5–5.2)
ALBUMIN/GLOB SERPL: 1.8 G/DL
ALP SERPL-CCNC: 74 U/L (ref 39–117)
ALT SERPL-CCNC: 12 U/L (ref 1–33)
AST SERPL-CCNC: 19 U/L (ref 1–32)
BILIRUB SERPL-MCNC: 0.3 MG/DL (ref 0–1.2)
BUN SERPL-MCNC: 14 MG/DL (ref 8–23)
BUN/CREAT SERPL: 23.3 (ref 7–25)
CALCIUM SERPL-MCNC: 9.5 MG/DL (ref 8.6–10.5)
CHLORIDE SERPL-SCNC: 105 MMOL/L (ref 98–107)
CO2 SERPL-SCNC: 29.6 MMOL/L (ref 22–29)
CREAT SERPL-MCNC: 0.6 MG/DL (ref 0.57–1)
ERYTHROCYTE [DISTWIDTH] IN BLOOD BY AUTOMATED COUNT: 14.5 % (ref 12.3–15.4)
GLOBULIN SER CALC-MCNC: 2.4 GM/DL
GLUCOSE SERPL-MCNC: 108 MG/DL (ref 65–99)
HCT VFR BLD AUTO: 43.1 % (ref 34–46.6)
HGB BLD-MCNC: 13.9 G/DL (ref 12–15.9)
MCH RBC QN AUTO: 27.2 PG (ref 26.6–33)
MCHC RBC AUTO-ENTMCNC: 32.3 G/DL (ref 31.5–35.7)
MCV RBC AUTO: 84.3 FL (ref 79–97)
PLATELET # BLD AUTO: 270 10*3/MM3 (ref 140–450)
POTASSIUM SERPL-SCNC: 4.2 MMOL/L (ref 3.5–5.2)
PROT SERPL-MCNC: 6.7 G/DL (ref 6–8.5)
RBC # BLD AUTO: 5.11 10*6/MM3 (ref 3.77–5.28)
SODIUM SERPL-SCNC: 142 MMOL/L (ref 136–145)
SPECIMEN STATUS: NORMAL
WBC # BLD AUTO: 7.27 10*3/MM3 (ref 3.4–10.8)

## 2021-01-26 ENCOUNTER — TELEMEDICINE (OUTPATIENT)
Dept: FAMILY MEDICINE CLINIC | Facility: CLINIC | Age: 81
End: 2021-01-26

## 2021-01-26 VITALS
DIASTOLIC BLOOD PRESSURE: 74 MMHG | SYSTOLIC BLOOD PRESSURE: 129 MMHG | HEART RATE: 74 BPM | BODY MASS INDEX: 24.8 KG/M2 | WEIGHT: 140 LBS

## 2021-01-26 DIAGNOSIS — E11.9 CONTROLLED TYPE 2 DIABETES MELLITUS WITHOUT COMPLICATION, UNSPECIFIED WHETHER LONG TERM INSULIN USE (HCC): Primary | ICD-10-CM

## 2021-01-26 DIAGNOSIS — G25.81 RESTLESS LEGS SYNDROME (RLS): ICD-10-CM

## 2021-01-26 DIAGNOSIS — Z00.00 MEDICARE ANNUAL WELLNESS VISIT, SUBSEQUENT: Primary | ICD-10-CM

## 2021-01-26 DIAGNOSIS — F41.9 ANXIETY: ICD-10-CM

## 2021-01-26 DIAGNOSIS — D50.9 IRON DEFICIENCY ANEMIA, UNSPECIFIED IRON DEFICIENCY ANEMIA TYPE: ICD-10-CM

## 2021-01-26 PROCEDURE — 99213 OFFICE O/P EST LOW 20 MIN: CPT | Performed by: FAMILY MEDICINE

## 2021-01-26 PROCEDURE — G0439 PPPS, SUBSEQ VISIT: HCPCS | Performed by: FAMILY MEDICINE

## 2021-01-26 RX ORDER — GABAPENTIN 100 MG/1
300 CAPSULE ORAL NIGHTLY
COMMUNITY
End: 2021-04-30

## 2021-01-26 NOTE — PROGRESS NOTES
Mary Ann Davies is a 80 y.o. female.   Annual Exam (Subsequent Medicare Exam)    History of Present Illness     The following portions of the patient's history were reviewed and updated as appropriate: allergies, current medications, past family history, past medical history, past social history, past surgical history and problem list.    Review of Systems    Objective   Virtual Visit Physical Exam      Assessment/Plan   Problem List Items Addressed This Visit     None               No follow-ups on file.

## 2021-01-27 LAB
ALBUMIN/CREAT UR: 11 MG/G CREAT (ref 0–29)
CHOLEST SERPL-MCNC: 220 MG/DL (ref 0–200)
CHOLEST/HDLC SERPL: 6.47 {RATIO}
CREAT UR-MCNC: 60 MG/DL
FERRITIN SERPL-MCNC: 25.9 NG/ML (ref 13–150)
HDLC SERPL-MCNC: 34 MG/DL (ref 40–60)
IRON SATN MFR SERPL: 11 % (ref 20–50)
IRON SERPL-MCNC: 50 MCG/DL (ref 37–145)
LDLC SERPL CALC-MCNC: 120 MG/DL (ref 0–100)
MICROALBUMIN UR-MCNC: 6.8 UG/ML
TIBC SERPL-MCNC: 469 MCG/DL
TRIGL SERPL-MCNC: 373 MG/DL (ref 0–150)
UIBC SERPL-MCNC: 419 MCG/DL (ref 112–346)
VLDLC SERPL CALC-MCNC: 66 MG/DL (ref 5–40)

## 2021-01-28 LAB — HBA1C MFR BLD: 6.2 % (ref 4.8–5.6)

## 2021-01-28 NOTE — PATIENT INSTRUCTIONS
Medicare Wellness  Personal Prevention Plan of Service     Date of Office Visit:  2021  Encounter Provider:  Mike Worrell MD  Place of Service:  [facility] PRIMARY [facility]ARE  Patient Name: Jessica Davies  :  1940    As part of the Medicare Wellness portion of your visit today, we are providing you with this personalized preventive plan of services (PPPS). This plan is based upon recommendations of the [place] Preventive Services Task Force (USPSTF) and the Advisory Committee on Immunization Practices (ACIP).    This lists the preventive care services that should be considered, and provides dates of when you are due. Items listed as completed are up-to-date and do not require any further intervention.    Health Maintenance   Topic Date Due   • DXA SCAN  2019   • MAMMOGRAM  2020   • HEMOGLOBIN A1C  2021   • DIABETIC EYE EXAM  2021   • LIPID PANEL  2022   • ANNUAL WELLNESS VISIT  2022   • URINE MICROALBUMIN  2022   • COLONOSCOPY  10/01/2022   • TDAP/TD VACCINES (2 - Td) 2026   • INFLUENZA VACCINE  Completed   • Pneumococcal Vaccine 65+  Completed   • ZOSTER VACCINE  Addressed   • MENINGOCOCCAL VACCINE  Aged Out       No orders of the defined types were placed in this encounter.      Return in about 6 months (around 2021) for Recheck.

## 2021-01-28 NOTE — PROGRESS NOTES
Medicare Subsequent Wellness Visit  Subjective   History of Present Illness    Jessica Davies is a 80 y.o. female who presents for an Medicare Wellness Visit. In addition, we addressed the following health issues:  She has a hx of afib and is followed by cardiology. She is still taking Eliquis.  She is taking metoprolol for rate control.  She has diet controlled DM2 and is well managed.Labs have been ordered to evaluate,  She has chronic iron deficiency anemia and labs have been ordered to evaluate where she is.  She has anxiety and is well managed on Clonazepam. She is taking this only occasionally and feels that it helps her get through her day.     PMH, PSH, SocHx, FamHx, Allergies, and Medications: Reviewed and updated in the history section of chart.  Family History   Problem Relation Age of Onset   • Early death Mother    • Alcohol abuse Mother    • Asthma Mother    • Birth defects Mother    • Heart attack Mother    • Heart failure Mother    • Cancer Father         Prostate   • Heart disease Father    • Diabetes Father    • Heart attack Father    • Prostate cancer Father 70   • Hyperlipidemia Sister    • Diabetes Paternal Grandmother    • Diabetes Paternal Grandfather    • Heart failure Maternal Aunt    • Brain cancer Maternal Aunt    • Asthma Paternal Aunt        Social History     Social History Narrative    Lives at home with        Allergies   Allergen Reactions   • Azithromycin Palpitations   • Cholestatin Palpitations   • Rivaroxaban Other (See Comments)     Tongue tingling   • Sulfamethoxazole-Trimethoprim Rash       Outpatient Medications Prior to Visit   Medication Sig Dispense Refill   • apixaban (ELIQUIS) 5 MG tablet tablet Take 5 mg by mouth.     • clonazePAM (KlonoPIN) 1 MG tablet Take 1 tablet by mouth 2 (Two) Times a Day As Needed for Anxiety. for anxiety 60 tablet 0   • DIGESTIVE ENZYMES PO Take  by mouth.     • estradiol (ESTRACE) 0.1 MG/GM vaginal cream Insert  into the vagina.      • gabapentin (NEURONTIN) 100 MG capsule Take 300 mg by mouth Every Night.     • Licorice, Glycyrrhiza glabra, (LICORICE PO) Take  by mouth.     • metoprolol succinate XL (TOPROL-XL) 25 MG 24 hr tablet Take 0.5 tablets by mouth Daily.     • NON FORMULARY Osteo Prime     • Probiotic Product (PROBIOTIC ADVANCED PO) Take  by mouth.     • tretinoin (RETIN-A) 0.025 % cream APPLY TO AFFECTED AREA EVERY DAY AT NIGHT AS DIRECTED     • acetaminophen (TYLENOL) 500 MG tablet Take 500 mg by mouth Every 6 (Six) Hours As Needed for Mild Pain (1-3).     • diltiaZEM CD (CARDIZEM CD) 120 MG 24 hr capsule TK 1 C PO D  3   • traMADol (ULTRAM) 50 MG tablet Take 2 tablets by mouth 2 (Two) Times a Day. 60 tablet 1     No facility-administered medications prior to visit.         Patient Active Problem List   Diagnosis   • Cancer (CMS/HCC)   • Malignant neoplasm of breast (CMS/HCC)   • Spinal stenosis of cervical region   • Congenital absence of fingers or toes   • Osteopenia   • Diabetes mellitus type 2, controlled, without complications (CMS/HCC)   • Herpes   • Elevated cholesterol   • Osteoarthritis of lumbar spine   • Kyphoscoliosis and scoliosis   • Hemispheric carotid artery syndrome   • Restless legs syndrome (RLS)   • Abnormal MRI, thoracic spine   • Paroxysmal atrial fibrillation (CMS/HCC)   • Dyspnea on exertion   • Multifocal atrial tachycardia (CMS/HCC)   • Paroxysmal atrial flutter (CMS/HCC)   • Anxiety   • Iron deficiency anemia         Patient Care Team:  Mike Worrell MD as PCP - General (Family Medicine)  Mele Chapa MD as Consulting Physician (Cardiology)  Health Habits:  Current Diet: Well balanced diet  Tobacco use.  Distant past  Dental Exam.  Advised  Eye Exam.  Advised  Exercise: Advised      Recent Hospitalizations:  none    Age-appropriate Screening Schedule:  Refer to the list below for future screening recommendations based on patient's age. Orders for these recommended tests are listed in the plan  section. The patient has been provided with a written plan.      Health Maintenance   Topic Date Due   • DXA SCAN  02/07/2019   • MAMMOGRAM  05/16/2020   • HEMOGLOBIN A1C  07/22/2021   • DIABETIC EYE EXAM  11/18/2021   • LIPID PANEL  01/22/2022   • ANNUAL WELLNESS VISIT  01/26/2022   • URINE MICROALBUMIN  01/26/2022   • COLONOSCOPY  10/01/2022   • TDAP/TD VACCINES (2 - Td) 12/22/2026   • INFLUENZA VACCINE  Completed   • Pneumococcal Vaccine 65+  Completed   • ZOSTER VACCINE  Addressed   • MENINGOCOCCAL VACCINE  Aged Out       Depression Screen:   PHQ-2/PHQ-9 Depression Screening 1/26/2021   Little interest or pleasure in doing things 0   Feeling down, depressed, or hopeless 0   Trouble falling or staying asleep, or sleeping too much -   Feeling tired or having little energy -   Poor appetite or overeating -   Feeling bad about yourself - or that you are a failure or have let yourself or your family down -   Trouble concentrating on things, such as reading the newspaper or watching television -   Moving or speaking so slowly that other people could have noticed. Or the opposite - being so fidgety or restless that you have been moving around a lot more than usual -   Thoughts that you would be better off dead, or of hurting yourself in some way -   Total Score 0       Functional and Cognitive Screening:  Functional & Cognitive Status 1/26/2021   Do you have difficulty preparing food and eating? No   Do you have difficulty bathing yourself, getting dressed or grooming yourself? No   Do you have difficulty using the toilet? No   Do you have difficulty moving around from place to place? No   Do you have trouble with steps or getting out of a bed or a chair? No   Current Diet Well Balanced Diet   Dental Exam Up to date   Eye Exam Up to date   Exercise (times per week) 3 times per week   Current Exercises Include Walking   Current Exercise Activities Include -   Do you need help using the phone?  No   Are you deaf or do  you have serious difficulty hearing?  No   Do you need help with transportation? No   Do you need help shopping? No   Do you need help preparing meals?  No   Do you need help with housework?  No   Do you need help with laundry? No   Do you need help taking your medications? No   Do you need help managing money? No   Do you ever drive or ride in a car without wearing a seat belt? No   Have you felt unusual stress, anger or loneliness in the last month? No   Who do you live with? Spouse   If you need help, do you have trouble finding someone available to you? No   Have you been bothered in the last four weeks by sexual problems? No   Do you have difficulty concentrating, remembering or making decisions? No     Does the patient have evidence of cognitive impairment?  None    Compared to one year ago, the patient feels their physical health and mental health are the same.       Review of Systems   Constitutional: Negative.    HENT: Negative.    Eyes: Negative.    Respiratory: Negative.    Cardiovascular: Negative.    Gastrointestinal: Negative.    Endocrine: Negative.    Genitourinary: Negative.    Musculoskeletal: Positive for back pain.   Skin: Negative.    Allergic/Immunologic: Negative.    Neurological: Negative.    Hematological: Negative.    Psychiatric/Behavioral: Negative.        Objective     Vitals:    01/26/21 1303   BP: 129/74   Pulse: 74   Weight: 63.5 kg (140 lb)       Body mass index is 24.8 kg/m².    PHYSICAL EXAM  Vitals reviewed and on chart.  HEENT: PERRLA, EOMI. Oral mucosa moist  LUNGS: Effort normal  NEURO: CN II - XII grossly intact  PSYCH: good mood, positive affect, alert and engaged. No thoughts of self harm expressed.    ASSESSMENT AND PLAN  Mammogram: Will advise  Bone Density : Will advise      Problem List Items Addressed This Visit        Hematology and Neoplasia    Iron deficiency anemia  Labs pending to evaluate.       Mental Health    Anxiety  The patient has read and signed the [place] Controlled Substance Contract.  I will continue to see patient for regular follow up appointments.  She are well controlled on current medication.  HARRISON has been reviewed by me and is updated every 3 months. The patient is aware of the potential for addiction and dependence.           Neuro    Restless legs syndrome (RLS)   he states she is doing better with Klonopin.      Other Visit Diagnoses     Medicare annual wellness visit, subsequent    -  Primary          Orders:  No orders of the defined types were placed in this encounter.  I will call her with lab results.    Follow Up:  No follow-ups on file.     ADVANCED DIRECTIVES:   ACP discussion was held with the patient during this visit. Patient has an advance directive (not in EMR), copy requested.    An After Visit Summary and PPPS with all of these plans were given to the patient.

## 2021-03-11 DIAGNOSIS — I48.91 ATRIAL FIBRILLATION, UNSPECIFIED TYPE (HCC): Primary | ICD-10-CM

## 2021-04-09 ENCOUNTER — TELEPHONE (OUTPATIENT)
Dept: GASTROENTEROLOGY | Facility: CLINIC | Age: 81
End: 2021-04-09

## 2021-04-09 NOTE — TELEPHONE ENCOUNTER
Returned patient call and she was checking to see if her records were sent over and states she filled out a release of information.She may call back for an office visit as she is having some problems with her throat and epigastric pain but states she is following up with some cardiology appointments first.

## 2021-04-30 ENCOUNTER — TRANSCRIBE ORDERS (OUTPATIENT)
Dept: CARDIOLOGY | Facility: CLINIC | Age: 81
End: 2021-04-30

## 2021-04-30 ENCOUNTER — OFFICE VISIT (OUTPATIENT)
Dept: CARDIOLOGY | Facility: CLINIC | Age: 81
End: 2021-04-30

## 2021-04-30 VITALS
HEIGHT: 63 IN | WEIGHT: 141 LBS | SYSTOLIC BLOOD PRESSURE: 128 MMHG | HEART RATE: 122 BPM | DIASTOLIC BLOOD PRESSURE: 78 MMHG | BODY MASS INDEX: 24.98 KG/M2

## 2021-04-30 DIAGNOSIS — I48.11 LONGSTANDING PERSISTENT ATRIAL FIBRILLATION (HCC): Primary | ICD-10-CM

## 2021-04-30 DIAGNOSIS — Z01.810 PRE-OPERATIVE CARDIOVASCULAR EXAMINATION: Primary | ICD-10-CM

## 2021-04-30 DIAGNOSIS — Z13.6 SCREENING FOR ISCHEMIC HEART DISEASE: ICD-10-CM

## 2021-04-30 PROCEDURE — 93000 ELECTROCARDIOGRAM COMPLETE: CPT | Performed by: INTERNAL MEDICINE

## 2021-04-30 PROCEDURE — 99204 OFFICE O/P NEW MOD 45 MIN: CPT | Performed by: INTERNAL MEDICINE

## 2021-04-30 NOTE — PROGRESS NOTES
Date of Office Visit: 2021  Encounter Provider: Jorge Monteiro MD  Place of Service: [facility] CARDIOLOGY  Patient Name: Jessica Davies  : 1940    Subjective:     Encounter Date:2021      Patient ID: Jessica Davies is a 80 y.o. female who has a cc of  Persistent AF     Started 4 years but it started as an intermittent.     Symptoms are fatigue and hernandez and soa and palp. Lots of symptoms.     I reviewed her records and her notes show PAF but a most recent monitor shows persistent AF.     She got worse with the AF in Feb. Not sure why. She now has more pain in the joints and back.     There have been no hospital admission since the last visit.     There have been no bleeding events.       Past Medical History:   Diagnosis Date   • Abdominal pain, LUQ (left upper quadrant)    • Anxiety 1/3/2019   • Atrial fibrillation (CMS/HCC)    • Birth defect    • Cancer (CMS/HCC)     Breast   • Chest pain    • DDD (degenerative disc disease), cervical     Neck and Lumbar   • Diabetes mellitus type 2, controlled, without complications (CMS/HCC)    • Early satiety    • Esophageal reflux    • Heartburn    • Hyperlipidemia    • Low back pain    • Stroke (CMS/HCC)        Social History     Socioeconomic History   • Marital status:      Spouse name: Not on file   • Number of children: 2   • Years of education: [employer]   • Highest education level: Not on file   Tobacco Use   • Smoking status: Former Smoker     Packs/day: 3.00     Types: Cigarettes     Start date:      Quit date: 1974     Years since quittin.3   • Smokeless tobacco: Never Used   • Tobacco comment: quit 40 yrs ago   Vaping Use   • Vaping Use: Never used   Substance and Sexual Activity   • Alcohol use: Yes     Alcohol/week: 1.0 standard drinks     Types: 1 Glasses of wine per week     Comment: once a month   • Drug use: No   • Sexual activity: Defer     Partners: Male     Comment:        Family History  "  Problem Relation Age of Onset   • Early death Mother    • Alcohol abuse Mother    • Asthma Mother    • Birth defects Mother    • Heart attack Mother    • Heart failure Mother    • Cancer Father         Prostate   • Heart disease Father    • Diabetes Father    • Heart attack Father    • Prostate cancer Father 70   • Hyperlipidemia Sister    • Diabetes Paternal Grandmother    • Diabetes Paternal Grandfather    • Heart failure Maternal Aunt    • Brain cancer Maternal Aunt    • Asthma Paternal Aunt        Review of Systems   Constitutional: Negative for fever and night sweats.   HENT: Negative for ear pain and stridor.    Eyes: Negative for discharge and visual halos.   Cardiovascular: Negative for cyanosis.   Respiratory: Negative for hemoptysis and sputum production.    Hematologic/Lymphatic: Negative for adenopathy.   Skin: Negative for nail changes and unusual hair distribution.   Musculoskeletal: Positive for arthritis, back pain and joint pain. Negative for gout and joint swelling.   Gastrointestinal: Negative for bowel incontinence and flatus.   Genitourinary: Negative for dysuria and flank pain.   Neurological: Negative for seizures and tremors.   Psychiatric/Behavioral: Negative for altered mental status. The patient is not nervous/anxious.             Objective:     Vitals:    04/30/21 0959   BP: 128/78   Pulse: (!) 122   Weight: 64 kg (141 lb)   Height: 160 cm (63\")         Eyes:      General:         Right eye: No discharge.         Left eye: No discharge.   HENT:      Head: Normocephalic and atraumatic.   Neck:      Thyroid: No thyromegaly.      Vascular: No JVD.   Pulmonary:      Effort: Pulmonary effort is normal.      Breath sounds: Normal breath sounds. No rales.   Cardiovascular:      Normal rate. Irregularly irregular rhythm.      No gallop.   Edema:     Peripheral edema absent.   Abdominal:      General: Bowel sounds are normal.      Palpations: Abdomen is soft.      Tenderness: There is no " abdominal tenderness.   Musculoskeletal: Normal range of motion.         General: No deformity. Skin:     General: Skin is warm and dry.      Findings: No erythema.   Neurological:      Mental Status: Alert and oriented to person, place, and time.      Motor: Normal muscle tone.   Psychiatric:         Behavior: Behavior normal.         Thought Content: Thought content normal.           ECG 12 Lead    Date/Time: 4/30/2021 10:42 AM  Performed by: Jorge Monteiro MD  Authorized by: Jorge Monteiro MD   Comparison: compared with previous ECG   Similar to previous ECG  Rhythm: atrial fibrillation            Lab Review:       Assessment:          Diagnosis Plan   1. Longstanding persistent atrial fibrillation (CMS/HCC)            Plan:     She has LS persistent -- I think she has symptoms from the AF, but also has arthritis.     It won't be easy to treat this due to her age and co-morbid conditions like insomnia and arthritis.     I offered rhythm control but also that this hard     She had many questions.     45 minute visit. .     She has missed no doses of meds.     I think we should do another echo to assess LV function.

## 2021-05-03 ENCOUNTER — HOSPITAL ENCOUNTER (OUTPATIENT)
Dept: CARDIOLOGY | Facility: HOSPITAL | Age: 81
Discharge: HOME | End: 2021-05-03
Admitting: INTERNAL MEDICINE

## 2021-05-03 VITALS
HEART RATE: 100 BPM | BODY MASS INDEX: 24.98 KG/M2 | WEIGHT: 141 LBS | SYSTOLIC BLOOD PRESSURE: 128 MMHG | DIASTOLIC BLOOD PRESSURE: 78 MMHG | HEIGHT: 63 IN

## 2021-05-03 DIAGNOSIS — I48.11 LONGSTANDING PERSISTENT ATRIAL FIBRILLATION (HCC): ICD-10-CM

## 2021-05-03 PROCEDURE — 93306 TTE W/DOPPLER COMPLETE: CPT | Performed by: INTERNAL MEDICINE

## 2021-05-03 PROCEDURE — 93306 TTE W/DOPPLER COMPLETE: CPT

## 2021-05-04 LAB
AORTIC ARCH: 1.7 CM
ASCENDING AORTA: 2.9 CM
BH CV ECHO MEAS - ACS: 1.6 CM
BH CV ECHO MEAS - AO MAX PG (FULL): 2.8 MMHG
BH CV ECHO MEAS - AO MAX PG: 4.7 MMHG
BH CV ECHO MEAS - AO MEAN PG (FULL): 1.6 MMHG
BH CV ECHO MEAS - AO MEAN PG: 2.5 MMHG
BH CV ECHO MEAS - AO ROOT AREA (BSA CORRECTED): 1.4
BH CV ECHO MEAS - AO ROOT AREA: 4 CM^2
BH CV ECHO MEAS - AO ROOT DIAM: 2.3 CM
BH CV ECHO MEAS - AO V2 MAX: 108.6 CM/SEC
BH CV ECHO MEAS - AO V2 MEAN: 72.4 CM/SEC
BH CV ECHO MEAS - AO V2 VTI: 20.9 CM
BH CV ECHO MEAS - ASC AORTA: 2.9 CM
BH CV ECHO MEAS - AVA(I,A): 1.8 CM^2
BH CV ECHO MEAS - AVA(I,D): 1.8 CM^2
BH CV ECHO MEAS - AVA(V,A): 1.7 CM^2
BH CV ECHO MEAS - AVA(V,D): 1.7 CM^2
BH CV ECHO MEAS - BSA(HAYCOCK): 1.7 M^2
BH CV ECHO MEAS - BSA: 1.7 M^2
BH CV ECHO MEAS - BZI_BMI: 25 KILOGRAMS/M^2
BH CV ECHO MEAS - BZI_METRIC_HEIGHT: 160 CM
BH CV ECHO MEAS - BZI_METRIC_WEIGHT: 64 KG
BH CV ECHO MEAS - EDV(MOD-SP2): 42 ML
BH CV ECHO MEAS - EDV(MOD-SP4): 38 ML
BH CV ECHO MEAS - EDV(TEICH): 89 ML
BH CV ECHO MEAS - EF(CUBED): 70.6 %
BH CV ECHO MEAS - EF(MOD-BP): 58.3 %
BH CV ECHO MEAS - EF(MOD-SP2): 57.1 %
BH CV ECHO MEAS - EF(MOD-SP4): 60.5 %
BH CV ECHO MEAS - EF(TEICH): 62.5 %
BH CV ECHO MEAS - ESV(MOD-SP2): 18 ML
BH CV ECHO MEAS - ESV(MOD-SP4): 15 ML
BH CV ECHO MEAS - ESV(TEICH): 33.4 ML
BH CV ECHO MEAS - FS: 33.5 %
BH CV ECHO MEAS - IVS/LVPW: 1
BH CV ECHO MEAS - IVSD: 0.9 CM
BH CV ECHO MEAS - LV DIASTOLIC VOL/BSA (35-75): 22.8 ML/M^2
BH CV ECHO MEAS - LV MASS(C)D: 126.9 GRAMS
BH CV ECHO MEAS - LV MASS(C)DI: 76.1 GRAMS/M^2
BH CV ECHO MEAS - LV MAX PG: 2 MMHG
BH CV ECHO MEAS - LV MEAN PG: 0.89 MMHG
BH CV ECHO MEAS - LV SYSTOLIC VOL/BSA (12-30): 9 ML/M^2
BH CV ECHO MEAS - LV V1 MAX: 69.8 CM/SEC
BH CV ECHO MEAS - LV V1 MEAN: 42 CM/SEC
BH CV ECHO MEAS - LV V1 VTI: 14 CM
BH CV ECHO MEAS - LVIDD: 4.4 CM
BH CV ECHO MEAS - LVIDS: 2.9 CM
BH CV ECHO MEAS - LVLD AP2: 6.1 CM
BH CV ECHO MEAS - LVLD AP4: 6 CM
BH CV ECHO MEAS - LVLS AP2: 5.1 CM
BH CV ECHO MEAS - LVLS AP4: 5.3 CM
BH CV ECHO MEAS - LVOT AREA (M): 2.8 CM^2
BH CV ECHO MEAS - LVOT AREA: 2.7 CM^2
BH CV ECHO MEAS - LVOT DIAM: 1.9 CM
BH CV ECHO MEAS - LVPWD: 0.88 CM
BH CV ECHO MEAS - MR MAX PG: 127.8 MMHG
BH CV ECHO MEAS - MR MAX VEL: 565.2 CM/SEC
BH CV ECHO MEAS - MR MEAN PG: 88.7 MMHG
BH CV ECHO MEAS - MR MEAN VEL: 447.8 CM/SEC
BH CV ECHO MEAS - MR VTI: 175 CM
BH CV ECHO MEAS - MV DEC SLOPE: 682.4 CM/SEC^2
BH CV ECHO MEAS - MV DEC TIME: 0.14 SEC
BH CV ECHO MEAS - MV E MAX VEL: 110 CM/SEC
BH CV ECHO MEAS - MV MAX PG: 6.6 MMHG
BH CV ECHO MEAS - MV MEAN PG: 3.5 MMHG
BH CV ECHO MEAS - MV P1/2T MAX VEL: 106.2 CM/SEC
BH CV ECHO MEAS - MV P1/2T: 45.6 MSEC
BH CV ECHO MEAS - MV V2 MAX: 128.3 CM/SEC
BH CV ECHO MEAS - MV V2 MEAN: 89.7 CM/SEC
BH CV ECHO MEAS - MV V2 VTI: 20.5 CM
BH CV ECHO MEAS - MVA P1/2T LCG: 2.1 CM^2
BH CV ECHO MEAS - MVA(P1/2T): 4.8 CM^2
BH CV ECHO MEAS - MVA(VTI): 1.8 CM^2
BH CV ECHO MEAS - PA MAX PG (FULL): 0.37 MMHG
BH CV ECHO MEAS - PA MAX PG: 1.8 MMHG
BH CV ECHO MEAS - PA V2 MAX: 66.9 CM/SEC
BH CV ECHO MEAS - PVA(V,A): 2.8 CM^2
BH CV ECHO MEAS - PVA(V,D): 2.8 CM^2
BH CV ECHO MEAS - QP/QS: 1.3
BH CV ECHO MEAS - RAP SYSTOLE: 3 MMHG
BH CV ECHO MEAS - RV MAX PG: 1.4 MMHG
BH CV ECHO MEAS - RV MEAN PG: 0.8 MMHG
BH CV ECHO MEAS - RV V1 MAX: 59.6 CM/SEC
BH CV ECHO MEAS - RV V1 MEAN: 41.2 CM/SEC
BH CV ECHO MEAS - RV V1 VTI: 15.2 CM
BH CV ECHO MEAS - RVOT AREA: 3.2 CM^2
BH CV ECHO MEAS - RVOT DIAM: 2 CM
BH CV ECHO MEAS - RVSP: 37 MMHG
BH CV ECHO MEAS - SI(AO): 50 ML/M^2
BH CV ECHO MEAS - SI(CUBED): 36.8 ML/M^2
BH CV ECHO MEAS - SI(LVOT): 22.7 ML/M^2
BH CV ECHO MEAS - SI(MOD-SP2): 14.4 ML/M^2
BH CV ECHO MEAS - SI(MOD-SP4): 13.8 ML/M^2
BH CV ECHO MEAS - SI(TEICH): 33.4 ML/M^2
BH CV ECHO MEAS - SUP REN AO DIAM: 1.7 CM
BH CV ECHO MEAS - SV(AO): 83.3 ML
BH CV ECHO MEAS - SV(CUBED): 61.3 ML
BH CV ECHO MEAS - SV(LVOT): 37.8 ML
BH CV ECHO MEAS - SV(MOD-SP2): 24 ML
BH CV ECHO MEAS - SV(MOD-SP4): 23 ML
BH CV ECHO MEAS - SV(RVOT): 48.4 ML
BH CV ECHO MEAS - SV(TEICH): 55.6 ML
BH CV ECHO MEAS - TAPSE (>1.6): 1.6 CM
BH CV ECHO MEAS - TR MAX VEL: 285 CM/SEC
BH CV XLRA - RV BASE: 2.7 CM
BH CV XLRA - RV LENGTH: 5 CM
BH CV XLRA - RV MID: 2.1 CM
BH CV XLRA - TDI S': 11 CM/SEC
LEFT ATRIUM VOLUME INDEX: 23 ML/M2
MAXIMAL PREDICTED HEART RATE: 140 BPM
MR PISA EROA: 0.11 CM2
PISA ALIASING VEL: 39 M/S
PISA RADIUS: 0.5 CM
SINUS: 2.9 CM
STJ: 2.4 CM
STRESS TARGET HR: 119 BPM

## 2021-05-05 ENCOUNTER — TRANSCRIBE ORDERS (OUTPATIENT)
Dept: SLEEP MEDICINE | Facility: HOSPITAL | Age: 81
End: 2021-05-05

## 2021-05-05 DIAGNOSIS — Z01.818 OTHER SPECIFIED PRE-OPERATIVE EXAMINATION: Primary | ICD-10-CM

## 2021-05-07 ENCOUNTER — TELEPHONE (OUTPATIENT)
Dept: CARDIOLOGY | Facility: CLINIC | Age: 81
End: 2021-05-07

## 2021-05-07 NOTE — TELEPHONE ENCOUNTER
Pt called for the results of her Echo. She also wanted to know if it woruld affect her CV next week...Jessica

## 2021-05-07 NOTE — TELEPHONE ENCOUNTER
You can let her know that her echo is normal and that it should not have any bearing on the procedure

## 2021-05-11 ENCOUNTER — LAB (OUTPATIENT)
Dept: LAB | Facility: HOSPITAL | Age: 81
End: 2021-05-11

## 2021-05-11 DIAGNOSIS — Z13.6 SCREENING FOR ISCHEMIC HEART DISEASE: ICD-10-CM

## 2021-05-11 DIAGNOSIS — Z01.810 PRE-OPERATIVE CARDIOVASCULAR EXAMINATION: ICD-10-CM

## 2021-05-11 DIAGNOSIS — Z01.818 OTHER SPECIFIED PRE-OPERATIVE EXAMINATION: ICD-10-CM

## 2021-05-11 PROCEDURE — U0004 COV-19 TEST NON-CDC HGH THRU: HCPCS

## 2021-05-11 PROCEDURE — 36415 COLL VENOUS BLD VENIPUNCTURE: CPT

## 2021-05-11 PROCEDURE — C9803 HOPD COVID-19 SPEC COLLECT: HCPCS

## 2021-05-11 PROCEDURE — U0005 INFEC AGEN DETEC AMPLI PROBE: HCPCS

## 2021-05-11 PROCEDURE — 80048 BASIC METABOLIC PNL TOTAL CA: CPT

## 2021-05-13 ENCOUNTER — HOSPITAL ENCOUNTER (OUTPATIENT)
Dept: CARDIOLOGY | Facility: HOSPITAL | Age: 81
Discharge: HOME | End: 2021-05-13
Admitting: INTERNAL MEDICINE

## 2021-05-13 VITALS
RESPIRATION RATE: 16 BRPM | OXYGEN SATURATION: 94 % | TEMPERATURE: 98.3 F | HEIGHT: 63 IN | WEIGHT: 130 LBS | HEART RATE: 71 BPM | SYSTOLIC BLOOD PRESSURE: 112 MMHG | DIASTOLIC BLOOD PRESSURE: 56 MMHG | BODY MASS INDEX: 23.04 KG/M2

## 2021-05-13 DIAGNOSIS — I48.11 LONGSTANDING PERSISTENT ATRIAL FIBRILLATION (HCC): ICD-10-CM

## 2021-05-13 LAB — QT INTERVAL: 315 MS

## 2021-05-13 PROCEDURE — 93010 ELECTROCARDIOGRAM REPORT: CPT | Performed by: INTERNAL MEDICINE

## 2021-05-13 PROCEDURE — 99152 MOD SED SAME PHYS/QHP 5/>YRS: CPT

## 2021-05-13 PROCEDURE — 92960 CARDIOVERSION ELECTRIC EXT: CPT | Performed by: INTERNAL MEDICINE

## 2021-05-13 PROCEDURE — 93005 ELECTROCARDIOGRAM TRACING: CPT | Performed by: INTERNAL MEDICINE

## 2021-05-13 PROCEDURE — 92960 CARDIOVERSION ELECTRIC EXT: CPT

## 2021-05-13 RX ORDER — CLOBETASOL PROPIONATE 0.5 MG/G
OINTMENT TOPICAL 2 TIMES DAILY
COMMUNITY
End: 2023-01-20

## 2021-05-13 RX ORDER — SODIUM CHLORIDE 0.9 % (FLUSH) 0.9 %
3 SYRINGE (ML) INJECTION EVERY 12 HOURS SCHEDULED
Status: DISCONTINUED | OUTPATIENT
Start: 2021-05-13 | End: 2021-05-13 | Stop reason: HOSPADM

## 2021-05-13 RX ORDER — SODIUM CHLORIDE 9 MG/ML
75 INJECTION, SOLUTION INTRAVENOUS CONTINUOUS
Status: DISCONTINUED | OUTPATIENT
Start: 2021-05-13 | End: 2021-05-14 | Stop reason: HOSPADM

## 2021-05-13 RX ORDER — METOPROLOL SUCCINATE 25 MG/1
50 TABLET, EXTENDED RELEASE ORAL DAILY
Start: 2021-05-13 | End: 2021-08-11

## 2021-05-13 RX ORDER — SODIUM CHLORIDE 0.9 % (FLUSH) 0.9 %
10 SYRINGE (ML) INJECTION AS NEEDED
Status: DISCONTINUED | OUTPATIENT
Start: 2021-05-13 | End: 2021-05-13 | Stop reason: HOSPADM

## 2021-05-13 RX ORDER — METOPROLOL TARTRATE 5 MG/5ML
INJECTION INTRAVENOUS
Status: COMPLETED | OUTPATIENT
Start: 2021-05-13 | End: 2021-05-13

## 2021-05-13 RX ADMIN — METHOHEXITAL SODIUM 55 MG: 500 INJECTION, POWDER, LYOPHILIZED, FOR SOLUTION INTRAMUSCULAR; INTRAVENOUS; RECTAL at 09:42

## 2021-05-13 RX ADMIN — SODIUM CHLORIDE 75 ML/HR: 9 INJECTION, SOLUTION INTRAVENOUS at 09:01

## 2021-05-13 RX ADMIN — METOROPROLOL TARTRATE 5 MG: 5 INJECTION, SOLUTION INTRAVENOUS at 09:46

## 2021-05-13 RX ADMIN — METOROPROLOL TARTRATE 5 MG: 5 INJECTION, SOLUTION INTRAVENOUS at 09:54

## 2021-05-14 LAB
MAXIMAL PREDICTED HEART RATE: 140 BPM
STRESS TARGET HR: 119 BPM

## 2021-06-28 NOTE — PROGRESS NOTES
"Chief Complaint   Patient presents with   • Heartburn     c/o sore throat from acid reflux   • Difficulty Swallowing         History of Present Illness  Patient for evaluation of chronic and worsening reflux.  She also has a history of mild anemia with gastritis and esophagitis.She reports constant throat clearing and a constant sore throat. She does take allergy shots. If she eats too quickly she will have some dysphagia and food seems to be hung up. She has not regurgitated much recently. She report a cough at bedtime. She reports that she goes to bed about 5-6 hours after her last meal of the day. She has Type 2 diabetes which is diet controlled. She reports occasional early satiety. She reports occasional tightness in her upper stomach if she overeats.     She reports having gas all the time. She reports belching and flatus. She reports abdominal bloating. She follows a vegetarian diet and eats a lot of fiber. She takes a daily probiotic as well as enzymes. She has not tried a low FODMAP diet. She denies any family history of colon cancer.        Result Review :       Comprehensive Metabolic Panel (01/22/2021 08:41)  CBC (No Diff) (01/22/2021 08:41)  Iron and TIBC (01/22/2021 08:41)  PROGRESS NOTES - SCAN - GEO MUNOZ MD (06/22/2017)      Vital Signs:   /76   Pulse 93   Temp 97.8 °F (36.6 °C) (Temporal)   Resp 16   Ht 160 cm (63\")   Wt 59.6 kg (131 lb 4.8 oz)   SpO2 96%   BMI 23.26 kg/m²     Body mass index is 23.26 kg/m².     Physical Exam  Vitals reviewed.   Constitutional:       Appearance: Normal appearance.   Abdominal:      General: Bowel sounds are normal. There is no distension.      Palpations: Abdomen is soft. Abdomen is not rigid. There is no mass or pulsatile mass.      Tenderness: There is no abdominal tenderness. There is no guarding or rebound.           Assessment and Plan    Diagnoses and all orders for this visit:    1. Gastroesophageal reflux disease, unspecified whether " esophagitis present (Primary)    2. Dysphagia, unspecified type  Comments:  new and undiagnosed problem    3. Excessive gas    4. Early satiety    Other orders  -     NM Gastric Emptying; Future       Documentation by Lottie ODOM acting as a scribe in the following sections (HPI, Assessment, Plan) for the undersigned provider.     BRIEF SUMMARY  Patient having new issues with dysphagia as well as new issues with early satiety.  We will proceed with an EGD as well as a gastric emptying study.  She also is having gas and bloating and will try a low FODMAP diet.  Further recommendations to follow.    I have reviewed and confirmed the accuracy of the HPI and Assessment and Plan as documented by the APRN LEI Chao        Follow Up   No follow-ups on file.    Patient Instructions   1. For further evaluation of dysphagia we will schedule an EGD.    2. We will also order a gastric emptying study for further evaluation of early satiety. You will be contacted to schedule this testing and we will contact you with results once available for our review.

## 2021-06-29 ENCOUNTER — PREP FOR SURGERY (OUTPATIENT)
Dept: SURGERY | Facility: SURGERY CENTER | Age: 81
End: 2021-06-29

## 2021-06-29 ENCOUNTER — OFFICE VISIT (OUTPATIENT)
Dept: GASTROENTEROLOGY | Facility: CLINIC | Age: 81
End: 2021-06-29

## 2021-06-29 VITALS
WEIGHT: 131.3 LBS | SYSTOLIC BLOOD PRESSURE: 122 MMHG | HEIGHT: 63 IN | RESPIRATION RATE: 16 BRPM | OXYGEN SATURATION: 96 % | DIASTOLIC BLOOD PRESSURE: 76 MMHG | TEMPERATURE: 97.8 F | BODY MASS INDEX: 23.27 KG/M2 | HEART RATE: 93 BPM

## 2021-06-29 DIAGNOSIS — K21.9 GASTROESOPHAGEAL REFLUX DISEASE, UNSPECIFIED WHETHER ESOPHAGITIS PRESENT: Primary | ICD-10-CM

## 2021-06-29 DIAGNOSIS — R14.3 EXCESSIVE GAS: ICD-10-CM

## 2021-06-29 DIAGNOSIS — R68.81 EARLY SATIETY: ICD-10-CM

## 2021-06-29 DIAGNOSIS — R13.10 DYSPHAGIA, UNSPECIFIED TYPE: ICD-10-CM

## 2021-06-29 PROCEDURE — 99204 OFFICE O/P NEW MOD 45 MIN: CPT | Performed by: INTERNAL MEDICINE

## 2021-06-29 RX ORDER — SODIUM CHLORIDE, SODIUM LACTATE, POTASSIUM CHLORIDE, CALCIUM CHLORIDE 600; 310; 30; 20 MG/100ML; MG/100ML; MG/100ML; MG/100ML
30 INJECTION, SOLUTION INTRAVENOUS CONTINUOUS PRN
Status: CANCELLED | OUTPATIENT
Start: 2021-07-06

## 2021-06-29 RX ORDER — SODIUM CHLORIDE 0.9 % (FLUSH) 0.9 %
3 SYRINGE (ML) INJECTION EVERY 12 HOURS SCHEDULED
Status: CANCELLED | OUTPATIENT
Start: 2021-07-06

## 2021-06-29 RX ORDER — SODIUM CHLORIDE 0.9 % (FLUSH) 0.9 %
10 SYRINGE (ML) INJECTION AS NEEDED
Status: CANCELLED | OUTPATIENT
Start: 2021-07-06

## 2021-06-29 NOTE — PATIENT INSTRUCTIONS
1. For further evaluation of dysphagia we will schedule an EGD.    2. We will also order a gastric emptying study for further evaluation of early satiety. You will be contacted to schedule this testing and we will contact you with results once available for our review.

## 2021-07-01 ENCOUNTER — TELEPHONE (OUTPATIENT)
Dept: CARDIOLOGY | Facility: CLINIC | Age: 81
End: 2021-07-01

## 2021-07-01 ENCOUNTER — TRANSCRIBE ORDERS (OUTPATIENT)
Dept: SLEEP MEDICINE | Facility: HOSPITAL | Age: 81
End: 2021-07-01

## 2021-07-01 DIAGNOSIS — Z01.818 OTHER SPECIFIED PRE-OPERATIVE EXAMINATION: Primary | ICD-10-CM

## 2021-07-01 NOTE — TELEPHONE ENCOUNTER
Clearance letter generated in olook and sent to Dr. Hood via epic, as well as hard copy faxed to [phone].

## 2021-07-01 NOTE — TELEPHONE ENCOUNTER
Faxed clearance request received from [facility].    Patient is scheduled for EGD with Dr. Cash Hood on 7/6/21 and is needing clearance as well as instructions on holding eliquis.  She is s/p cardioversion on 5/13/21, last seen in the office on 4/30/21.    No hx of stroke or valve replacement.

## 2021-07-03 ENCOUNTER — LAB (OUTPATIENT)
Dept: LAB | Facility: HOSPITAL | Age: 81
End: 2021-07-03

## 2021-07-03 DIAGNOSIS — Z01.818 OTHER SPECIFIED PRE-OPERATIVE EXAMINATION: ICD-10-CM

## 2021-07-03 LAB — SARS-COV-2 ORF1AB RESP QL NAA+PROBE: NOT DETECTED

## 2021-07-03 PROCEDURE — C9803 HOPD COVID-19 SPEC COLLECT: HCPCS

## 2021-07-03 PROCEDURE — U0004 COV-19 TEST NON-CDC HGH THRU: HCPCS

## 2021-07-03 PROCEDURE — U0005 INFEC AGEN DETEC AMPLI PROBE: HCPCS

## 2021-07-06 ENCOUNTER — ANESTHESIA EVENT (OUTPATIENT)
Dept: GASTROENTEROLOGY | Facility: HOSPITAL | Age: 81
End: 2021-07-06

## 2021-07-06 ENCOUNTER — HOSPITAL ENCOUNTER (OUTPATIENT)
Facility: HOSPITAL | Age: 81
Setting detail: HOSPITAL OUTPATIENT SURGERY
Discharge: HOME | End: 2021-07-06
Attending: INTERNAL MEDICINE | Admitting: INTERNAL MEDICINE

## 2021-07-06 ENCOUNTER — TELEPHONE (OUTPATIENT)
Dept: CARDIOLOGY | Facility: CLINIC | Age: 81
End: 2021-07-06

## 2021-07-06 ENCOUNTER — ANESTHESIA (OUTPATIENT)
Dept: GASTROENTEROLOGY | Facility: HOSPITAL | Age: 81
End: 2021-07-06

## 2021-07-06 VITALS
RESPIRATION RATE: 16 BRPM | HEART RATE: 98 BPM | SYSTOLIC BLOOD PRESSURE: 128 MMHG | DIASTOLIC BLOOD PRESSURE: 90 MMHG | HEIGHT: 63 IN | BODY MASS INDEX: 23.04 KG/M2 | WEIGHT: 130 LBS | OXYGEN SATURATION: 96 %

## 2021-07-06 DIAGNOSIS — R68.81 EARLY SATIETY: ICD-10-CM

## 2021-07-06 DIAGNOSIS — R13.10 DYSPHAGIA, UNSPECIFIED TYPE: ICD-10-CM

## 2021-07-06 DIAGNOSIS — K21.9 GASTROESOPHAGEAL REFLUX DISEASE, UNSPECIFIED WHETHER ESOPHAGITIS PRESENT: ICD-10-CM

## 2021-07-06 LAB
GLUCOSE BLDC GLUCOMTR-MCNC: 101 MG/DL (ref 70–130)
QT INTERVAL: 316 MS

## 2021-07-06 PROCEDURE — 93005 ELECTROCARDIOGRAM TRACING: CPT | Performed by: ANESTHESIOLOGY

## 2021-07-06 PROCEDURE — 43249 ESOPH EGD DILATION <30 MM: CPT | Performed by: INTERNAL MEDICINE

## 2021-07-06 PROCEDURE — 88305 TISSUE EXAM BY PATHOLOGIST: CPT | Performed by: INTERNAL MEDICINE

## 2021-07-06 PROCEDURE — 82962 GLUCOSE BLOOD TEST: CPT

## 2021-07-06 PROCEDURE — 25010000002 PROPOFOL 10 MG/ML EMULSION: Performed by: ANESTHESIOLOGY

## 2021-07-06 PROCEDURE — 93010 ELECTROCARDIOGRAM REPORT: CPT | Performed by: INTERNAL MEDICINE

## 2021-07-06 PROCEDURE — 43239 EGD BIOPSY SINGLE/MULTIPLE: CPT | Performed by: INTERNAL MEDICINE

## 2021-07-06 RX ORDER — ESMOLOL HYDROCHLORIDE 10 MG/ML
INJECTION INTRAVENOUS AS NEEDED
Status: DISCONTINUED | OUTPATIENT
Start: 2021-07-06 | End: 2021-07-06 | Stop reason: SURG

## 2021-07-06 RX ORDER — LIDOCAINE HYDROCHLORIDE 20 MG/ML
INJECTION, SOLUTION INFILTRATION; PERINEURAL AS NEEDED
Status: DISCONTINUED | OUTPATIENT
Start: 2021-07-06 | End: 2021-07-06 | Stop reason: SURG

## 2021-07-06 RX ORDER — PROPOFOL 10 MG/ML
VIAL (ML) INTRAVENOUS AS NEEDED
Status: DISCONTINUED | OUTPATIENT
Start: 2021-07-06 | End: 2021-07-06 | Stop reason: SURG

## 2021-07-06 RX ORDER — SODIUM CHLORIDE, SODIUM LACTATE, POTASSIUM CHLORIDE, CALCIUM CHLORIDE 600; 310; 30; 20 MG/100ML; MG/100ML; MG/100ML; MG/100ML
INJECTION, SOLUTION INTRAVENOUS CONTINUOUS PRN
Status: DISCONTINUED | OUTPATIENT
Start: 2021-07-06 | End: 2021-07-06 | Stop reason: SURG

## 2021-07-06 RX ADMIN — PROPOFOL 140 MG: 10 INJECTION, EMULSION INTRAVENOUS at 10:35

## 2021-07-06 RX ADMIN — ESMOLOL HYDROCHLORIDE 20 MG: 10 INJECTION, SOLUTION INTRAVENOUS at 10:35

## 2021-07-06 RX ADMIN — ESMOLOL HYDROCHLORIDE 20 MG: 10 INJECTION, SOLUTION INTRAVENOUS at 10:34

## 2021-07-06 RX ADMIN — ESMOLOL HYDROCHLORIDE 20 MG: 10 INJECTION, SOLUTION INTRAVENOUS at 10:36

## 2021-07-06 RX ADMIN — ESMOLOL HYDROCHLORIDE 20 MG: 10 INJECTION, SOLUTION INTRAVENOUS at 10:56

## 2021-07-06 RX ADMIN — SODIUM CHLORIDE, POTASSIUM CHLORIDE, SODIUM LACTATE AND CALCIUM CHLORIDE: 600; 310; 30; 20 INJECTION, SOLUTION INTRAVENOUS at 10:33

## 2021-07-06 RX ADMIN — ESMOLOL HYDROCHLORIDE 20 MG: 10 INJECTION, SOLUTION INTRAVENOUS at 10:58

## 2021-07-06 RX ADMIN — LIDOCAINE HYDROCHLORIDE 20 MG: 20 INJECTION, SOLUTION INFILTRATION; PERINEURAL at 10:34

## 2021-07-06 NOTE — ANESTHESIA POSTPROCEDURE EVALUATION
"Patient: Jessica Davies    Procedure Summary     Date: 07/06/21 Room / Location: [facility] ENDOSCOPY 9 /  [facility]    Anesthesia Start: 1033 Anesthesia Stop: 1109    Procedure: ESOPHAGOGASTRODUODENOSCOPY WITH BALLOON DILATATION AT 18, 19, AND 20 WITH COLD BIOPSIES (N/A ) Diagnosis:       Gastroesophageal reflux disease, unspecified whether esophagitis present      Dysphagia, unspecified type      Early satiety      (Gastroesophageal reflux disease, unspecified whether esophagitis present [K21.9])      (Dysphagia, unspecified type [R13.10])      (Early satiety [R68.81])    Surgeons: Cash Hood MD Provider: Manda Armstrong MD    Anesthesia Type: MAC ASA Status: 3          Anesthesia Type: MAC    Vitals  Vitals Value Taken Time   BP 99/66 07/06/21 1107   Temp     Pulse 151 07/06/21 1107   Resp 16 07/06/21 1107   SpO2 91 % 07/06/21 1107           Post Anesthesia Care and Evaluation    Patient location during evaluation: PHASE II  Patient participation: complete - patient participated  Level of consciousness: awake and alert  Pain management: adequate  Airway patency: patent  Anesthetic complications: No anesthetic complications    Cardiovascular status: acceptable  Respiratory status: acceptable  Hydration status: acceptable    Comments: BP 99/66 (BP Location: Left arm, Patient Position: Sitting)   Pulse (!) 151   Resp 16   Ht 160 cm (63\")   Wt 59 kg (130 lb)   SpO2 91%   BMI 23.03 kg/m²         "

## 2021-07-06 NOTE — TELEPHONE ENCOUNTER
Dr. Monteiro,    Pt had an EGD completed today. Before the procedure her HR was 98. During the procedure, her HR went up to 150. After the procedure, her HR came back down to 105. EKG did show she is in afib. Anesthisiology wanted me to let you know this information.    Thank you,    Erika Cano, RN  Triage [facility]

## 2021-07-06 NOTE — TELEPHONE ENCOUNTER
Yeah we can work her in sometime in the next 4 to 6 weeks.  Her atrial fibrillation is not a new problem.  My last notes make it clear that this will not be an easy thing to treat and as long as she is on anticoagulants there is no significant danger

## 2021-07-06 NOTE — TELEPHONE ENCOUNTER
The nurse in endo called back.  Anesthesia wanted us to be aware the heart rate didn't go up just during the procedure.  It went up before and after, but was not sustained.  Hr now is 92, and still in afib.    They want to know if it was ok from your stand point to discharge.  I have asked for them to place a cardiology consult if anesthesia would like further advise on this pt.    Looks like she she had a cardioversion 5/13/21. Do we need to get her in to be seen?    Thanks  Deidre Hood RN  Triage nurse

## 2021-07-06 NOTE — ANESTHESIA PREPROCEDURE EVALUATION
Anesthesia Evaluation                  Airway   Mallampati: II  TM distance: >3 FB  Neck ROM: full  Narrow palate  Dental      Comment: Loose lower front tooth    Pulmonary    (+) a smoker Former, shortness of breath,   Cardiovascular     (+) dysrhythmias Atrial Fib, Atrial Flutter, MERINO, hyperlipidemia,       Neuro/Psych  (+) TIA, CVA,       ROS Comment: Restless leg syn  GI/Hepatic/Renal/Endo    (+)  GERD,  diabetes mellitus,     Musculoskeletal     Abdominal    Substance History      OB/GYN          Other   blood dyscrasia anemia,   history of cancer (breast)                  Anesthesia Plan    ASA 3     MAC     intravenous induction     Anesthetic plan, all risks, benefits, and alternatives have been provided, discussed and informed consent has been obtained with: patient.

## 2021-07-06 NOTE — NURSING NOTE
Pt in endo for procedure. Dr Casanova's office ([place]) notified pt was in atrial fib with rates in 150 which was treated by AA. It went to 150 before and after the case.Office said they would follow up with pt. Pt to advised to follow up with Dr Monteiro's office if she doesn't hear from them.

## 2021-07-06 NOTE — DISCHARGE INSTRUCTIONS
For the next 24 hours patient needs to be with a responsible adult.    For 24 hours DO NOT drive, operate machinery, appliances, drink alcohol, make important decisions or sign legal documents.    Start with a light or bland diet if you are feeling sick to your stomach otherwise advance to regular diet as tolerated.    Follow recommendations on procedure report if provided by your doctor.    Call Dr Hood for problems [phone]    Problems may include but not limited to: large amounts of bleeding, trouble breathing, repeated vomiting, severe unrelieved pain, fever or chills.

## 2021-07-07 LAB
LAB AP CASE REPORT: NORMAL
PATH REPORT.FINAL DX SPEC: NORMAL
PATH REPORT.GROSS SPEC: NORMAL

## 2021-08-03 ENCOUNTER — OFFICE VISIT (OUTPATIENT)
Dept: GASTROENTEROLOGY | Facility: CLINIC | Age: 81
End: 2021-08-03

## 2021-08-03 DIAGNOSIS — R05.9 COUGH: Chronic | ICD-10-CM

## 2021-08-03 DIAGNOSIS — K21.9 GASTROESOPHAGEAL REFLUX DISEASE WITHOUT ESOPHAGITIS: Primary | Chronic | ICD-10-CM

## 2021-08-03 DIAGNOSIS — K44.9 HIATAL HERNIA: Chronic | ICD-10-CM

## 2021-08-03 DIAGNOSIS — K22.2 SCHATZKI'S RING: ICD-10-CM

## 2021-08-03 DIAGNOSIS — J02.9 SORE THROAT: Chronic | ICD-10-CM

## 2021-08-03 PROCEDURE — 99443 PR PHYS/QHP TELEPHONE EVALUATION 21-30 MIN: CPT | Performed by: NURSE PRACTITIONER

## 2021-08-03 RX ORDER — OMEPRAZOLE 20 MG/1
20 CAPSULE, DELAYED RELEASE ORAL DAILY
Qty: 30 CAPSULE | Refills: 5 | Status: SHIPPED | OUTPATIENT
Start: 2021-08-03 | End: 2021-08-25 | Stop reason: SINTOL

## 2021-08-03 RX ORDER — NYSTATIN 100000 U/G
CREAM TOPICAL
COMMUNITY
Start: 2021-07-08 | End: 2022-07-06

## 2021-08-03 RX ORDER — TRIAMCINOLONE ACETONIDE 1 MG/G
CREAM TOPICAL
COMMUNITY
Start: 2021-07-08 | End: 2022-07-06

## 2021-08-03 NOTE — PATIENT INSTRUCTIONS
1.  We will start you on omeprazole 20 mg once daily to see if this helps to improve your upper GI symptoms.  This prescription has been sent to your pharmacy.    2.  Recommend that you schedule a follow-up appointment with ENT, Dr. Andrew Mcneal for reevaluation of your ENT symptoms as this could be contributing to your cough and sore throat.    3.  We will plan for telephone follow-up visit in 3 months on 11/2/2021 at 10:45 AM for reassessment of symptoms, or sooner should symptoms worsen or fail to improve.

## 2021-08-03 NOTE — PROGRESS NOTES
Chief Complaint   Patient presents with   • Follow-up     after EGD         History of Present Illness  81-year-old female presents today for telephone follow-up.  She was last seen on 6/29/2021.  She is a history of mild anemia, gastritis, esophagitis, and dysphagia.  She underwent EGD on 7/6/2021 revealing 1 cm hiatal hernia and a Schatzki's ring that was traversed and dilated with a balloon dilator to 20 mm.  Both distal and proximal esophagus biopsies were unremarkable.    She reports experiencing chest irritation and a cough on a daily basis.  She reports seeing an allergist and receives immunotherapy weekly.  She has also seen an ENT in the past, Dr. Andrew Mcneal, but states it has been greater than a year.  Occasionally she will take a Zyrtec and states that it does improve her symptoms. She has not taken antihistamine on a regular basis.  She states that her throat feels scratchy on a frequent basis.  She denies any nausea, vomiting, or dysphagia.  She reports constant clearing of her throat.  She has tried Zantac in the remote past.  She does not take an antacid medication on a regular basis.  She reports some recent weight loss, which she attributes to thyroid issues.  Her PCP is managing her thyroid.    Orts having regular daily bowel movements and denies have any diarrhea, constipation, melena, or hematochezia.  Her last colonoscopy was in 2016 and she states that no further colonoscopies were indicated.  She denies any family history of colon cancer.    You have chosen to receive care through a telephone visit.   Do you consent to use a telephone visit for your medical care today? Yes    Review of Systems   Constitutional: Negative for fever and unexpected weight change.   HENT: Positive for postnasal drip and sore throat. Negative for trouble swallowing.    Respiratory: Positive for cough.    Cardiovascular: Negative for chest pain.   Gastrointestinal: Negative for abdominal distention, abdominal pain,  anal bleeding, blood in stool, constipation, diarrhea, nausea, rectal pain and vomiting.         Result Review :      Office Visit with Cash Hood MD (06/29/2021)  UPPER GI ENDOSCOPY (07/06/2021 09:34)  Tissue Pathology Exam (07/06/2021 10:56)      Assessment and Plan    Diagnoses and all orders for this visit:    1. Gastroesophageal reflux disease without esophagitis (Primary)    2. Hiatal hernia  Comments:  1 cm    3. Schatzki's ring  Comments:  New problem.  Schatzki's ring was traversed and dilated on recent EGD    4. Cough    5. Sore throat    Other orders  -     omeprazole (priLOSEC) 20 MG capsule; Take 1 capsule by mouth Daily.  Dispense: 30 capsule; Refill: 5         This visit has been rescheduled as a phone visit to comply with patient safety concerns in accordance with CDC recommendations. Total time of discussion was 23 minutes.    Follow Up   Return in about 3 months (around 11/3/2021).    Patient Instructions   1.  We will start you on omeprazole 20 mg once daily to see if this helps to improve your upper GI symptoms.  This prescription has been sent to your pharmacy.    2.  Recommend that you schedule a follow-up appointment with ENT, Dr. Andrew Mcneal for reevaluation of your ENT symptoms as this could be contributing to your cough and sore throat.    3.  We will plan for telephone follow-up visit in 3 months on 11/2/2021 at 10:45 AM for reassessment of symptoms, or sooner should symptoms worsen or fail to improve.     Discussion:    Long discussion was held with the patient today during her telephone visit regarding her symptoms and plan of care moving forward.  EGD findings and pathology also reviewed with patient.  We will start the patient on omeprazole 20 mg once daily to see if this helps to improve her symptoms.  We have recommended that she contact her ENT, Dr. Andrew Mcneal, to make a follow-up appointment to see if he has any further recommendations regarding management of symptoms.   I do feel that the patient would ultimately benefit from a daily second-generation antihistamine such as Zyrtec or Xyzal for management of her sore throat, cough, and chest irritation.  We will plan for telephone follow-up visit on 11/2/2021 at 10:45 AM for reassessment of symptoms, or sooner should symptoms worsen or fail to improve.  Patient verbalized understand above plan of care and is in agreement.  All questions answered and support provided.      EMR Dragon/Transcription Disclaimer:  This document has been Dictated utilizing Dragon dictation.

## 2021-08-11 ENCOUNTER — OFFICE VISIT (OUTPATIENT)
Dept: CARDIOLOGY | Facility: CLINIC | Age: 81
End: 2021-08-11

## 2021-08-11 VITALS
HEIGHT: 63 IN | WEIGHT: 134 LBS | SYSTOLIC BLOOD PRESSURE: 132 MMHG | BODY MASS INDEX: 23.74 KG/M2 | DIASTOLIC BLOOD PRESSURE: 88 MMHG | HEART RATE: 88 BPM

## 2021-08-11 DIAGNOSIS — I48.11 LONGSTANDING PERSISTENT ATRIAL FIBRILLATION (HCC): ICD-10-CM

## 2021-08-11 DIAGNOSIS — I47.1 MULTIFOCAL ATRIAL TACHYCARDIA (HCC): ICD-10-CM

## 2021-08-11 DIAGNOSIS — R06.09 DYSPNEA ON EXERTION: ICD-10-CM

## 2021-08-11 DIAGNOSIS — I48.0 PAROXYSMAL ATRIAL FIBRILLATION (HCC): Primary | ICD-10-CM

## 2021-08-11 PROCEDURE — 99214 OFFICE O/P EST MOD 30 MIN: CPT | Performed by: INTERNAL MEDICINE

## 2021-08-11 PROCEDURE — 93000 ELECTROCARDIOGRAM COMPLETE: CPT | Performed by: INTERNAL MEDICINE

## 2021-08-11 RX ORDER — NADOLOL 80 MG/1
80 TABLET ORAL DAILY
Qty: 90 TABLET | Refills: 1 | Status: SHIPPED | OUTPATIENT
Start: 2021-08-11 | End: 2022-04-15

## 2021-08-11 NOTE — PROGRESS NOTES
Date of Office Visit: 2021  Encounter Provider: Jorge Monteiro MD  Place of Service: [facility] CARDIOLOG[facility]  Patient Name: Jessica Davies  : 1940    Subjective:     Encounter Date:2021      Patient ID: Jessica Davies is a 81 y.o. female who has a cc of  LS pers AF and she is here after a stroke and they noted a high heart rate.     I did CV in may and she felt good for 2 days then the higher heart rates came back in 2 days.     She doesn't feel as bed now as she did before the CV.     She has moderate LA enlargement.     She has fatigue and hernandez and poor exercise tolerance.       There have been no hospital admission since the last visit.     There have been no bleeding events.       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   • Cancer (CMS/HCC)     Breast   • Chest pain    • DDD (degenerative disc disease), cervical     Neck and Lumbar   • Diabetes mellitus type 2, controlled, without complications (CMS/HCC)    • Disease of thyroid gland    • Early satiety    • Esophageal reflux    • Heartburn    • Hyperlipidemia    • Low back pain    • Stroke (CMS/HCC)     TIA       Social History     Socioeconomic History   • Marital status:      Spouse name: Not on file   • Number of children: 2   • Years of education: [employer]   • Highest education level: Not on file   Tobacco Use   • Smoking status: Former Smoker     Packs/day: 3.00     Types: Cigarettes     Start date:      Quit date:      Years since quittin.6   • Smokeless tobacco: Never Used   • Tobacco comment: quit 40 yrs ago   Vaping Use   • Vaping Use: Never used   Substance and Sexual Activity   • Alcohol use: Not Currently     Alcohol/week: 1.0 standard drinks     Types: 1 Glasses of wine per week     Comment: once a month   • Drug use: No  "  • Sexual activity: Defer     Partners: Male     Comment:        Family History   Problem Relation Age of Onset   • Early death Mother    • Alcohol abuse Mother    • Asthma Mother    • Heart attack Mother    • Heart failure Mother    • Cancer Father         Prostate   • Heart disease Father    • Diabetes Father    • Heart attack Father    • Prostate cancer Father 70   • Hyperlipidemia Sister    • Diabetes Paternal Grandmother    • Diabetes Paternal Grandfather    • Heart failure Maternal Aunt    • Brain cancer Maternal Aunt    • Asthma Paternal Aunt    • Colon cancer Neg Hx    • Colon polyps Neg Hx    • Crohn's disease Neg Hx    • Irritable bowel syndrome Neg Hx    • Ulcerative colitis Neg Hx        Review of Systems   Constitutional: Negative for fever and night sweats.   HENT: Negative for ear pain and stridor.    Eyes: Negative for discharge and visual halos.   Cardiovascular: Negative for cyanosis.   Respiratory: Negative for hemoptysis and sputum production.    Hematologic/Lymphatic: Negative for adenopathy.   Skin: Negative for nail changes and unusual hair distribution.   Musculoskeletal: Positive for arthritis and joint pain. Negative for gout and joint swelling.   Gastrointestinal: Negative for bowel incontinence and flatus.   Genitourinary: Negative for dysuria and flank pain.   Neurological: Negative for seizures and tremors.   Psychiatric/Behavioral: Negative for altered mental status. The patient is not nervous/anxious.             Objective:     Vitals:    08/11/21 1332   BP: 132/88   Pulse: 88   Weight: 60.8 kg (134 lb)   Height: 160 cm (63\")         Eyes:      General:         Right eye: No discharge.         Left eye: No discharge.   HENT:      Head: Normocephalic and atraumatic.   Neck:      Thyroid: No thyromegaly.      Vascular: No JVD.   Pulmonary:      Effort: Pulmonary effort is normal.      Breath sounds: Normal breath sounds. No rales.   Cardiovascular:      Normal rate. Irregularly " irregular rhythm.      No gallop.   Edema:     Peripheral edema absent.   Abdominal:      General: Bowel sounds are normal.      Palpations: Abdomen is soft.      Tenderness: There is no abdominal tenderness.   Musculoskeletal: Normal range of motion.         General: No deformity. Skin:     General: Skin is warm and dry.      Findings: No erythema.   Neurological:      Mental Status: Alert and oriented to person, place, and time.      Motor: Normal muscle tone.   Psychiatric:         Behavior: Behavior normal.         Thought Content: Thought content normal.           ECG 12 Lead    Date/Time: 8/11/2021 2:16 PM  Performed by: Jorge Monteiro MD  Authorized by: Jorge Monteiro MD   Comparison: compared with previous ECG   Similar to previous ECG  Rhythm: atrial fibrillation    Clinical impression: abnormal EKG            Lab Review:       Assessment:          Diagnosis Plan   1. Paroxysmal atrial fibrillation (CMS/HCC)     2. Multifocal atrial tachycardia (CMS/HCC)     3. Longstanding persistent atrial fibrillation (CMS/HCC)            Plan:       Super hard decision rhythm vs rate contol    45 minute discussion     She could live with the AF w reduced QOL or we could try ablation.     Either is ok. I am seriously torn.     Will have her a diff betablocker. She thinks side effects from metoprolol

## 2021-08-24 NOTE — TELEPHONE ENCOUNTER
----- Message from LEI Chao sent at 8/24/2021  1:22 PM EDT -----  Regarding: FW: Non-Urgent Medical Question  Contact: [phone]  I would not think that the abdominal pain would be from the omeprazole, but gas could be a side effect.  We could try her on famotidine 40 mg once daily and send this into her pharmacy.  She can have a quantity of 30 with 5 refills and we recommend she take this once daily.  I will have her discontinue the omeprazole and replace it with his famotidine to see if her symptoms improve.  Thank you.  LEI Arias  ----- Message -----  From: Ro Davies MA  Sent: 8/20/2021   2:28 PM EDT  To: LEI Chao  Subject: FW: Non-Urgent Medical Question                      ----- Message -----  From: Jessica Davies  Sent: 8/20/2021   2:01 PM EDT  To: [facility]  Subject: Non-Urgent Medical Question                      I have been taking Omeprazole for 17 days and I am having pains in my stomach and side that comes and goes.  Also, copious amounts of gas and back aches.  Is this a side effect of the drug that I should live with or is there something else going on?

## 2021-08-25 RX ORDER — FAMOTIDINE 20 MG/1
40 TABLET, FILM COATED ORAL DAILY
Qty: 30 TABLET | Refills: 5 | Status: SHIPPED | OUTPATIENT
Start: 2021-08-25 | End: 2021-11-15

## 2021-08-31 ENCOUNTER — APPOINTMENT (OUTPATIENT)
Dept: NUCLEAR MEDICINE | Facility: HOSPITAL | Age: 81
End: 2021-08-31

## 2021-11-15 ENCOUNTER — OFFICE VISIT (OUTPATIENT)
Dept: CARDIOLOGY | Facility: CLINIC | Age: 81
End: 2021-11-15

## 2021-11-15 VITALS
SYSTOLIC BLOOD PRESSURE: 134 MMHG | WEIGHT: 137 LBS | HEIGHT: 63 IN | DIASTOLIC BLOOD PRESSURE: 90 MMHG | HEART RATE: 86 BPM | BODY MASS INDEX: 24.27 KG/M2

## 2021-11-15 DIAGNOSIS — G45.1 HEMISPHERIC CAROTID ARTERY SYNDROME: ICD-10-CM

## 2021-11-15 DIAGNOSIS — I47.1 MULTIFOCAL ATRIAL TACHYCARDIA (HCC): ICD-10-CM

## 2021-11-15 DIAGNOSIS — I48.11 LONGSTANDING PERSISTENT ATRIAL FIBRILLATION (HCC): Primary | ICD-10-CM

## 2021-11-15 PROCEDURE — 93000 ELECTROCARDIOGRAM COMPLETE: CPT | Performed by: INTERNAL MEDICINE

## 2021-11-15 PROCEDURE — 99214 OFFICE O/P EST MOD 30 MIN: CPT | Performed by: INTERNAL MEDICINE

## 2021-11-15 RX ORDER — DIGOXIN 125 MCG
125 TABLET ORAL DAILY
Qty: 30 TABLET | Refills: 11 | Status: SHIPPED | OUTPATIENT
Start: 2021-11-15 | End: 2021-12-07 | Stop reason: SDUPTHER

## 2021-11-15 NOTE — PROGRESS NOTES
Date of Office Visit: 11/15/2021  Encounter Provider: Jorge Monteiro MD  Place of Service: [facility] CARDIOLOGY  Patient Name: Jessica Davies  : 1940    Subjective:     Encounter Date:11/15/2021      Patient ID: Jessica Davies is a 81 y.o. female who has a cc of  Longstanding persistent AF and she is on nadolol.     No HISTORY of STROKE -- ( I typed wrong on my last note)     She is starting exercise. And she is having trouble with her breathing.     She has a mild episode of palp.        No anginal chest pain,   Mild  [place] and some after too.    No soa,   No fainting,  No orthostasis.   No edema.   Exercise tolerance: reduced.     There have been no hospital admission since the last visit.     There have been no bleeding events.       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   • Cancer (CMS/HCC)     Breast   • Chest pain    • DDD (degenerative disc disease), cervical     Neck and Lumbar   • Diabetes mellitus type 2, controlled, without complications (CMS/HCC)    • Disease of thyroid gland    • Early satiety    • Esophageal reflux    • Heartburn    • Hyperlipidemia    • Low back pain    • Stroke (CMS/HCC)     TIA       Social History     Socioeconomic History   • Marital status:    • Number of children: 2   • Years of education: [employer]   Tobacco Use   • Smoking status: Former Smoker     Packs/day: 3.00     Types: Cigarettes     Start date:      Quit date:      Years since quittin.9   • Smokeless tobacco: Never Used   • Tobacco comment: quit 40 yrs ago   Vaping Use   • Vaping Use: Never used   Substance and Sexual Activity   • Alcohol use: Not Currently     Alcohol/week: 1.0 standard drink     Types: 1 Glasses of wine per week     Comment: once a month   • Drug use: No   • Sexual activity: Defer      "Partners: Male     Comment:        Family History   Problem Relation Age of Onset   • Early death Mother    • Alcohol abuse Mother    • Asthma Mother    • Heart attack Mother    • Heart failure Mother    • Cancer Father         Prostate   • Heart disease Father    • Diabetes Father    • Heart attack Father    • Prostate cancer Father 70   • Hyperlipidemia Sister    • Diabetes Paternal Grandmother    • Diabetes Paternal Grandfather    • Heart failure Maternal Aunt    • Brain cancer Maternal Aunt    • Asthma Paternal Aunt    • Colon cancer Neg Hx    • Colon polyps Neg Hx    • Crohn's disease Neg Hx    • Irritable bowel syndrome Neg Hx    • Ulcerative colitis Neg Hx        Review of Systems   Constitutional: Negative for fever and night sweats.   HENT: Negative for ear pain and stridor.    Eyes: Negative for discharge and visual halos.   Cardiovascular: Negative for cyanosis.   Respiratory: Negative for hemoptysis and sputum production.    Hematologic/Lymphatic: Negative for adenopathy.   Skin: Negative for nail changes and unusual hair distribution.   Musculoskeletal: Negative for gout and joint swelling.   Gastrointestinal: Negative for bowel incontinence and flatus.   Genitourinary: Negative for dysuria and flank pain.   Neurological: Negative for seizures and tremors.   Psychiatric/Behavioral: Negative for altered mental status. The patient is not nervous/anxious.             Objective:     Vitals:    11/15/21 1154   BP: 134/90   Pulse: 86   Weight: 62.1 kg (137 lb)   Height: 160 cm (63\")         Eyes:      General:         Right eye: No discharge.         Left eye: No discharge.   HENT:      Head: Normocephalic and atraumatic.   Neck:      Thyroid: No thyromegaly.      Vascular: No JVD.   Pulmonary:      Effort: Pulmonary effort is normal.      Breath sounds: Normal breath sounds. No rales.   Cardiovascular:      Normal rate. Irregularly irregular rhythm.      No gallop.   Edema:     Peripheral edema " absent.   Abdominal:      General: Bowel sounds are normal.      Palpations: Abdomen is soft.      Tenderness: There is no abdominal tenderness.   Musculoskeletal: Normal range of motion.         General: No deformity. Skin:     General: Skin is warm and dry.      Findings: No erythema.   Neurological:      Mental Status: Alert and oriented to person, place, and time.      Motor: Normal muscle tone.   Psychiatric:         Behavior: Behavior normal.         Thought Content: Thought content normal.           ECG 12 Lead    Date/Time: 11/15/2021 12:04 PM  Performed by: Jorge Monteiro MD  Authorized by: Jorge Monteiro MD   Comparison: compared with previous ECG   Similar to previous ECG  Rhythm: atrial fibrillation            Lab Review:       Assessment:          Diagnosis Plan   1. Longstanding persistent atrial fibrillation (HCC)     2. Multifocal atrial tachycardia (HCC)     3. Hemispheric carotid artery syndrome            Plan:     She is better but still not totally happy with function.     Rate is not bad but I wonder if we did a little better might she feel more tolerant of exercise.     Today she sounds fast.     I will try a tiny bit of digoxin and see if that helps.

## 2021-11-24 ENCOUNTER — TELEPHONE (OUTPATIENT)
Dept: CARDIOLOGY | Facility: CLINIC | Age: 81
End: 2021-11-24

## 2021-11-24 NOTE — TELEPHONE ENCOUNTER
Pt called to let you know her HR is closer to 60 but she feels like she has less energy since she started the digoxin 125 mcg daily. This was started 11/15 she is asking if she should continue the medication...Jessica

## 2021-12-01 ENCOUNTER — LAB (OUTPATIENT)
Dept: LAB | Facility: HOSPITAL | Age: 81
End: 2021-12-01

## 2021-12-01 DIAGNOSIS — G45.1 HEMISPHERIC CAROTID ARTERY SYNDROME: ICD-10-CM

## 2021-12-01 DIAGNOSIS — I48.11 LONGSTANDING PERSISTENT ATRIAL FIBRILLATION (HCC): ICD-10-CM

## 2021-12-01 DIAGNOSIS — I47.1 MULTIFOCAL ATRIAL TACHYCARDIA (HCC): ICD-10-CM

## 2021-12-01 LAB — DIGOXIN SERPL-MCNC: 0.5 NG/ML (ref 0.6–1.2)

## 2021-12-01 PROCEDURE — 80162 ASSAY OF DIGOXIN TOTAL: CPT

## 2021-12-01 PROCEDURE — 36415 COLL VENOUS BLD VENIPUNCTURE: CPT

## 2021-12-07 RX ORDER — DIGOXIN 125 MCG
125 TABLET ORAL DAILY
Qty: 90 TABLET | Refills: 1 | Status: SHIPPED | OUTPATIENT
Start: 2021-12-07 | End: 2023-01-20

## 2022-03-01 DIAGNOSIS — F41.9 ANXIETY: Primary | ICD-10-CM

## 2022-03-01 RX ORDER — CLONAZEPAM 1 MG/1
1 TABLET ORAL 2 TIMES DAILY PRN
Qty: 60 TABLET | Refills: 0 | Status: SHIPPED | OUTPATIENT
Start: 2022-03-01 | End: 2022-07-06 | Stop reason: SDUPTHER

## 2022-04-15 RX ORDER — NADOLOL 80 MG/1
TABLET ORAL
Qty: 90 TABLET | Refills: 0 | Status: SHIPPED | OUTPATIENT
Start: 2022-04-15 | End: 2022-08-01 | Stop reason: SDUPTHER

## 2022-06-20 ENCOUNTER — HOSPITAL ENCOUNTER (OUTPATIENT)
Dept: CARDIOLOGY | Facility: HOSPITAL | Age: 82
Discharge: HOME | End: 2022-06-20
Admitting: INTERNAL MEDICINE

## 2022-06-20 ENCOUNTER — OFFICE VISIT (OUTPATIENT)
Dept: CARDIOLOGY | Facility: CLINIC | Age: 82
End: 2022-06-20

## 2022-06-20 VITALS
HEIGHT: 63 IN | WEIGHT: 137 LBS | HEART RATE: 84 BPM | BODY MASS INDEX: 24.27 KG/M2 | SYSTOLIC BLOOD PRESSURE: 158 MMHG | DIASTOLIC BLOOD PRESSURE: 82 MMHG

## 2022-06-20 VITALS
DIASTOLIC BLOOD PRESSURE: 92 MMHG | HEART RATE: 89 BPM | BODY MASS INDEX: 25.16 KG/M2 | SYSTOLIC BLOOD PRESSURE: 144 MMHG | WEIGHT: 142 LBS | HEIGHT: 63 IN

## 2022-06-20 DIAGNOSIS — R06.09 DYSPNEA ON EXERTION: ICD-10-CM

## 2022-06-20 DIAGNOSIS — I48.11 LONGSTANDING PERSISTENT ATRIAL FIBRILLATION: ICD-10-CM

## 2022-06-20 DIAGNOSIS — I48.21 PERMANENT ATRIAL FIBRILLATION: Primary | ICD-10-CM

## 2022-06-20 DIAGNOSIS — I47.1 MULTIFOCAL ATRIAL TACHYCARDIA: ICD-10-CM

## 2022-06-20 LAB
AORTIC ARCH: 2.4 CM
ASCENDING AORTA: 2.7 CM
BH CV ECHO MEAS - ACS: 1.7 CM
BH CV ECHO MEAS - AI P1/2T: 509.7 MSEC
BH CV ECHO MEAS - AO MAX PG: 7.9 MMHG
BH CV ECHO MEAS - AO MEAN PG: 4.3 MMHG
BH CV ECHO MEAS - AO ROOT DIAM: 3.1 CM
BH CV ECHO MEAS - AO V2 MAX: 141 CM/SEC
BH CV ECHO MEAS - AO V2 VTI: 29.9 CM
BH CV ECHO MEAS - AVA(I,D): 1.46 CM2
BH CV ECHO MEAS - EDV(CUBED): 47.5 ML
BH CV ECHO MEAS - EDV(MOD-SP2): 71 ML
BH CV ECHO MEAS - EDV(MOD-SP4): 48 ML
BH CV ECHO MEAS - EF(MOD-BP): 61.1 %
BH CV ECHO MEAS - EF(MOD-SP2): 62 %
BH CV ECHO MEAS - EF(MOD-SP4): 62.5 %
BH CV ECHO MEAS - ESV(CUBED): 17.1 ML
BH CV ECHO MEAS - ESV(MOD-SP2): 27 ML
BH CV ECHO MEAS - ESV(MOD-SP4): 18 ML
BH CV ECHO MEAS - FS: 28.8 %
BH CV ECHO MEAS - IVS/LVPW: 0.99 CM
BH CV ECHO MEAS - IVSD: 1 CM
BH CV ECHO MEAS - LAT PEAK E' VEL: 7.7 CM/SEC
BH CV ECHO MEAS - LV DIASTOLIC VOL/BSA (35-75): 29.6 CM2
BH CV ECHO MEAS - LV MASS(C)D: 110.4 GRAMS
BH CV ECHO MEAS - LV MAX PG: 1.96 MMHG
BH CV ECHO MEAS - LV MEAN PG: 1.05 MMHG
BH CV ECHO MEAS - LV SYSTOLIC VOL/BSA (12-30): 11.1 CM2
BH CV ECHO MEAS - LV V1 MAX: 70 CM/SEC
BH CV ECHO MEAS - LV V1 VTI: 15.1 CM
BH CV ECHO MEAS - LVIDD: 3.6 CM
BH CV ECHO MEAS - LVIDS: 2.6 CM
BH CV ECHO MEAS - LVOT AREA: 2.9 CM2
BH CV ECHO MEAS - LVOT DIAM: 1.92 CM
BH CV ECHO MEAS - LVPWD: 1.01 CM
BH CV ECHO MEAS - MED PEAK E' VEL: 7.2 CM/SEC
BH CV ECHO MEAS - MR MAX PG: 101.2 MMHG
BH CV ECHO MEAS - MR MAX VEL: 502.9 CM/SEC
BH CV ECHO MEAS - MV DEC SLOPE: 340.5 CM/SEC2
BH CV ECHO MEAS - MV DEC TIME: 0.13 MSEC
BH CV ECHO MEAS - MV E MAX VEL: 129 CM/SEC
BH CV ECHO MEAS - MV MAX PG: 5.8 MMHG
BH CV ECHO MEAS - MV MEAN PG: 2.7 MMHG
BH CV ECHO MEAS - MV P1/2T: 104.9 MSEC
BH CV ECHO MEAS - MV V2 VTI: 29.5 CM
BH CV ECHO MEAS - MVA(P1/2T): 2.1 CM2
BH CV ECHO MEAS - MVA(VTI): 1.48 CM2
BH CV ECHO MEAS - PA ACC TIME: 0.06 SEC
BH CV ECHO MEAS - PA PR(ACCEL): 52.9 MMHG
BH CV ECHO MEAS - PA V2 MAX: 82.8 CM/SEC
BH CV ECHO MEAS - QP/QS: 0.8
BH CV ECHO MEAS - RAP SYSTOLE: 8 MMHG
BH CV ECHO MEAS - RV MAX PG: 1.08 MMHG
BH CV ECHO MEAS - RV V1 MAX: 51.8 CM/SEC
BH CV ECHO MEAS - RV V1 VTI: 10.6 CM
BH CV ECHO MEAS - RVOT DIAM: 2.04 CM
BH CV ECHO MEAS - RVSP: 46.6 MMHG
BH CV ECHO MEAS - SI(MOD-SP2): 27.1 ML/M2
BH CV ECHO MEAS - SI(MOD-SP4): 18.5 ML/M2
BH CV ECHO MEAS - SUP REN AO DIAM: 1.8 CM
BH CV ECHO MEAS - SV(LVOT): 43.6 ML
BH CV ECHO MEAS - SV(MOD-SP2): 44 ML
BH CV ECHO MEAS - SV(MOD-SP4): 30 ML
BH CV ECHO MEAS - SV(RVOT): 34.7 ML
BH CV ECHO MEAS - TAPSE (>1.6): 1.51 CM
BH CV ECHO MEAS - TR MAX PG: 38.6 MMHG
BH CV ECHO MEAS - TR MAX VEL: 310.5 CM/SEC
BH CV ECHO MEASUREMENTS AVERAGE E/E' RATIO: 17.32
BH CV XLRA - RV BASE: 3.2 CM
BH CV XLRA - RV LENGTH: 6.2 CM
BH CV XLRA - RV MID: 3 CM
BH CV XLRA - TDI S': 9 CM/SEC
LEFT ATRIUM VOLUME INDEX: 44 ML/M2
LV EF 2D ECHO EST: 60 %
MAXIMAL PREDICTED HEART RATE: 139 BPM
SINUS: 2.35 CM
STJ: 2.7 CM
STRESS TARGET HR: 118 BPM

## 2022-06-20 PROCEDURE — 99214 OFFICE O/P EST MOD 30 MIN: CPT | Performed by: INTERNAL MEDICINE

## 2022-06-20 PROCEDURE — 93306 TTE W/DOPPLER COMPLETE: CPT | Performed by: INTERNAL MEDICINE

## 2022-06-20 PROCEDURE — 93306 TTE W/DOPPLER COMPLETE: CPT

## 2022-06-20 PROCEDURE — 93000 ELECTROCARDIOGRAM COMPLETE: CPT | Performed by: INTERNAL MEDICINE

## 2022-06-20 NOTE — PROGRESS NOTES
Date of Office Visit: 2022  Encounter Provider: Jorge Monteiro MD  Place of Service: [facility] CARDIOLOG[facility]  Patient Name: Jessica Davies  : 1940    Subjective:     Encounter Date:2022      Patient ID: Jessica Davies is a 81 y.o. female who has a cc of  Perm AF and is here for fu.     She has hernandez and this is main problem.     LVEF 61% RVSP -- 46 mm   No anginal chest pain,     No soa,   No fainting,  No orthostasis.   No edema.   Exercise tolerance: limited mostly by severe joint pain and back pain     There have been no hospital admission since the last visit.     There have been no bleeding events.       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   • Cancer (HCC)     Breast   • Chest pain    • DDD (degenerative disc disease), cervical     Neck and Lumbar   • Diabetes mellitus type 2, controlled, without complications (HCC)    • Disease of thyroid gland    • Early satiety    • Esophageal reflux    • Heartburn    • Hyperlipidemia    • Low back pain    • Stroke (HCC)     TIA       Social History     Socioeconomic History   • Marital status:    • Number of children: 2   • Years of education: [employer]   Tobacco Use   • Smoking status: Former Smoker     Packs/day: 3.00     Types: Cigarettes     Start date:      Quit date:      Years since quittin.4   • Smokeless tobacco: Never Used   • Tobacco comment: quit 40 yrs ago   Vaping Use   • Vaping Use: Never used   Substance and Sexual Activity   • Alcohol use: Not Currently     Alcohol/week: 1.0 standard drink     Types: 1 Glasses of wine per week     Comment: once a month   • Drug use: No   • Sexual activity: Defer     Partners: Male     Comment:        Family History   Problem Relation Age of Onset   • Early death Mother    • Alcohol abuse  "Mother    • Asthma Mother    • Heart attack Mother    • Heart failure Mother    • Cancer Father         Prostate   • Heart disease Father    • Diabetes Father    • Heart attack Father    • Prostate cancer Father 70   • Hyperlipidemia Sister    • Diabetes Paternal Grandmother    • Diabetes Paternal Grandfather    • Heart failure Maternal Aunt    • Brain cancer Maternal Aunt    • Asthma Paternal Aunt    • Colon cancer Neg Hx    • Colon polyps Neg Hx    • Crohn's disease Neg Hx    • Irritable bowel syndrome Neg Hx    • Ulcerative colitis Neg Hx        Review of Systems   Constitutional: Negative for fever and night sweats.   HENT: Negative for ear pain and stridor.    Eyes: Negative for discharge and visual halos.   Cardiovascular: Negative for cyanosis.   Respiratory: Negative for hemoptysis and sputum production.    Hematologic/Lymphatic: Negative for adenopathy.   Skin: Negative for nail changes and unusual hair distribution.   Musculoskeletal: Positive for arthritis, back pain and joint pain. Negative for gout and joint swelling.   Gastrointestinal: Negative for bowel incontinence and flatus.   Genitourinary: Negative for dysuria and flank pain.   Neurological: Negative for seizures and tremors.   Psychiatric/Behavioral: Negative for altered mental status. The patient is not nervous/anxious.             Objective:     Vitals:    06/20/22 1143   BP: 144/92   Pulse: 89   Weight: 64.4 kg (142 lb)   Height: 160 cm (63\")         Eyes:      General:         Right eye: No discharge.         Left eye: No discharge.   HENT:      Head: Normocephalic and atraumatic.   Neck:      Thyroid: No thyromegaly.      Vascular: No JVD.   Pulmonary:      Effort: Pulmonary effort is normal.      Breath sounds: Normal breath sounds. No rales.   Cardiovascular:      Normal rate. Irregularly irregular rhythm.      No gallop.   Edema:     Peripheral edema absent.   Abdominal:      General: Bowel sounds are normal.      Palpations: Abdomen is " soft.      Tenderness: There is no abdominal tenderness.   Musculoskeletal: Normal range of motion.         General: No deformity. Skin:     General: Skin is warm and dry.      Findings: No erythema.   Neurological:      Mental Status: Alert and oriented to person, place, and time.      Motor: Normal muscle tone.   Psychiatric:         Behavior: Behavior normal.         Thought Content: Thought content normal.           ECG 12 Lead    Date/Time: 6/20/2022 11:59 AM  Performed by: Jorge Monteiro MD  Authorized by: Jorge Monteiro MD   Comparison: compared with previous ECG   Similar to previous ECG  Rhythm: atrial fibrillation            Lab Review:       Assessment:          Diagnosis Plan   1. Permanent atrial fibrillation (HCC)     2. Dyspnea on exertion            Plan:     Her echo is great.   AF -- mostly rate controlled but I wonder if a little dilt might help her exercise tolerance as this is likely reduced by high rates.     Will try a little dilt short acting at low dose.     On OAC     Very reassuring echo.     I also think a daily exercise program would help

## 2022-07-06 ENCOUNTER — OFFICE VISIT (OUTPATIENT)
Dept: FAMILY MEDICINE CLINIC | Facility: CLINIC | Age: 82
End: 2022-07-06

## 2022-07-06 VITALS
DIASTOLIC BLOOD PRESSURE: 72 MMHG | HEART RATE: 94 BPM | HEIGHT: 63 IN | BODY MASS INDEX: 24.63 KG/M2 | WEIGHT: 139 LBS | OXYGEN SATURATION: 98 % | SYSTOLIC BLOOD PRESSURE: 118 MMHG

## 2022-07-06 DIAGNOSIS — D50.9 IRON DEFICIENCY ANEMIA, UNSPECIFIED IRON DEFICIENCY ANEMIA TYPE: ICD-10-CM

## 2022-07-06 DIAGNOSIS — Z00.00 ENCOUNTER FOR SUBSEQUENT ANNUAL WELLNESS VISIT (AWV) IN MEDICARE PATIENT: Primary | ICD-10-CM

## 2022-07-06 DIAGNOSIS — E78.00 ELEVATED CHOLESTEROL: ICD-10-CM

## 2022-07-06 DIAGNOSIS — F41.9 ANXIETY: ICD-10-CM

## 2022-07-06 DIAGNOSIS — E11.8 TYPE 2 DIABETES MELLITUS WITH UNSPECIFIED COMPLICATIONS: ICD-10-CM

## 2022-07-06 DIAGNOSIS — I48.92 PAROXYSMAL ATRIAL FLUTTER: ICD-10-CM

## 2022-07-06 PROBLEM — M50.30 DDD (DEGENERATIVE DISC DISEASE), CERVICAL: Status: ACTIVE | Noted: 2017-08-31

## 2022-07-06 PROBLEM — C44.91 BASAL CELL CARCINOMA OF SKIN: Status: ACTIVE | Noted: 2021-08-25

## 2022-07-06 PROBLEM — G25.81 RESTLESS LEGS SYNDROME (RLS): Status: ACTIVE | Noted: 2017-04-13

## 2022-07-06 PROBLEM — Z79.01 ANTICOAGULATED: Status: ACTIVE | Noted: 2021-03-01

## 2022-07-06 PROBLEM — Z85.3 PERSONAL HISTORY OF BREAST CANCER: Status: ACTIVE | Noted: 2021-08-25

## 2022-07-06 PROBLEM — I35.1 NONRHEUMATIC AORTIC VALVE INSUFFICIENCY: Status: ACTIVE | Noted: 2017-04-17

## 2022-07-06 PROBLEM — E04.2 MULTINODULAR GOITER: Status: ACTIVE | Noted: 2021-12-01

## 2022-07-06 PROBLEM — M19.90 ARTHRITIS: Status: ACTIVE | Noted: 2019-12-13

## 2022-07-06 PROCEDURE — 1170F FXNL STATUS ASSESSED: CPT | Performed by: NURSE PRACTITIONER

## 2022-07-06 PROCEDURE — G0439 PPPS, SUBSEQ VISIT: HCPCS | Performed by: NURSE PRACTITIONER

## 2022-07-06 PROCEDURE — 1160F RVW MEDS BY RX/DR IN RCRD: CPT | Performed by: NURSE PRACTITIONER

## 2022-07-06 RX ORDER — CLONAZEPAM 1 MG/1
1 TABLET ORAL 2 TIMES DAILY PRN
Qty: 60 TABLET | Refills: 0 | Status: SHIPPED | OUTPATIENT
Start: 2022-07-06

## 2022-07-06 NOTE — PROGRESS NOTES
The ABCs of the Annual Wellness Visit  Subsequent Medicare Wellness Visit    Chief Complaint   Patient presents with   • Annual Exam     AWV      Subjective    History of Present Illness:  Jessica Davies is a 81 y.o. female who presents for a Subsequent Medicare Wellness Visit.    The following portions of the patient's history were reviewed and   updated as appropriate: allergies, current medications, past family history, past medical history, past social history, past surgical history and problem list.    Compared to one year ago, the patient feels her physical   health is better.    Compared to one year ago, the patient feels her mental   health is better.    Recent Hospitalizations:  She was not admitted to the hospital during the last year.       Current Medical Providers:  Patient Care Team:  Mike Worrell MD as PCP - General (Family Medicine)  Mele Chapa MD as Consulting Physician (Cardiology)  Cash Hood MD as Consulting Physician (Gastroenterology)    Outpatient Medications Prior to Visit   Medication Sig Dispense Refill   • acetaminophen (TYLENOL) 500 MG tablet Take 500 mg by mouth Every 6 (Six) Hours As Needed for Mild Pain (1-3).     • apixaban (ELIQUIS) 5 MG tablet tablet Take 1 tablet by mouth Every 12 (Twelve) Hours. 180 tablet 1   • B Complex Vitamins (VITAMIN B COMPLEX PO) Take  by mouth.     • Calcium-Magnesium-Vitamin D (CALCIUM MAGNESIUM PO) Take  by mouth.     • clobetasol (TEMOVATE) 0.05 % ointment Apply  topically to the appropriate area as directed 2 (Two) Times a Day. APPLIES VAGINALLY TWICE DAILY     • clonazePAM (KlonoPIN) 1 MG tablet Take 1 tablet by mouth 2 (Two) Times a Day As Needed for Anxiety. 60 tablet 0   • DIGESTIVE ENZYMES PO Take  by mouth Daily.     • dilTIAZem (Cardizem) 30 MG tablet Take 1 tablet by mouth 2 (Two) Times a Day. 60 tablet 1   • nadolol (CORGARD) 80 MG tablet TAKE 1 TABLET BY MOUTH EVERY DAY 90 tablet 0   • Probiotic Product (PROBIOTIC  ADVANCED PO) Take  by mouth Daily.     • digoxin (LANOXIN) 125 MCG tablet Take 1 tablet by mouth Daily. 90 tablet 1   • Licorice, Glycyrrhiza glabra, (LICORICE PO) Take  by mouth Daily.     • NON FORMULARY Daily. Osteo Prime     • nystatin (MYCOSTATIN) 146299 UNIT/GM cream APPLY THIN COAT TO AFFECTED AREA(S) TWICE DAILY X 2 WEEKS**MIX WITH TRIAMCINOLONE**     • triamcinolone (KENALOG) 0.1 % cream APPLY THIN COAT TO AFFECTED AREA(S) TWICE DAILY X 2 WEEKS**MIX WITH NYSTATIN**       No facility-administered medications prior to visit.       No opioid medication identified on active medication list. I have reviewed chart for other potential  high risk medication/s and harmful drug interactions in the elderly.          Aspirin is not on active medication list.  Aspirin use is not indicated based on review of current medical condition/s. Risk of harm outweighs potential benefits.  .    Patient Active Problem List   Diagnosis   • Cancer (HCC)   • Malignant neoplasm of breast (HCC)   • Spinal stenosis of cervical region   • Congenital absence of fingers or toes   • Osteopenia   • Diabetes mellitus type 2, controlled, without complications (HCC)   • Herpes   • Elevated cholesterol   • Osteoarthritis of lumbar spine   • Kyphoscoliosis and scoliosis   • Hemispheric carotid artery syndrome   • Restless legs syndrome (RLS)   • Abnormal MRI, thoracic spine   • Dyspnea on exertion   • Multifocal atrial tachycardia (HCC)   • Paroxysmal atrial flutter (HCC)   • Anxiety   • Iron deficiency anemia   • Permanent atrial fibrillation (HCC)   • Gastroesophageal reflux disease   • Dysphagia   • Early satiety   • Anticoagulated   • Arthritis   • Basal cell carcinoma of skin   • DDD (degenerative disc disease), cervical   • Multinodular goiter   • Nonrheumatic aortic valve insufficiency   • Personal history of breast cancer   • Adactyly   • Type 2 diabetes mellitus without complication (HCC)   • Iron deficiency anemia   • Lumbar spondylosis  "  • Restless legs syndrome (RLS)     Advance Care Planning  Advance Directive is not on file.  ACP discussion was held with the patient during this visit. Patient does not have an advance directive, information provided.          Objective    Vitals:    22 1351   BP: 118/72   BP Location: Left arm   Patient Position: Sitting   Pulse: 94   SpO2: 98%   Weight: 63 kg (139 lb)   Height: 160 cm (63\")     Estimated body mass index is 24.62 kg/m² as calculated from the following:    Height as of this encounter: 160 cm (63\").    Weight as of this encounter: 63 kg (139 lb).    BMI is within normal parameters. No other follow-up for BMI required.      Does the patient have evidence of cognitive impairment? Yes    Physical Exam            HEALTH RISK ASSESSMENT    Smoking Status:  Social History     Tobacco Use   Smoking Status Former Smoker   • Packs/day: 3.00   • Types: Cigarettes   • Start date:    • Quit date:    • Years since quittin.5   Smokeless Tobacco Never Used   Tobacco Comment    quit 40 yrs ago     Alcohol Consumption:  Social History     Substance and Sexual Activity   Alcohol Use Not Currently   • Alcohol/week: 1.0 standard drink   • Types: 1 Glasses of wine per week    Comment: once a month     Fall Risk Screen:    ISAAK Fall Risk Assessment was completed, and patient is at LOW risk for falls.Assessment completed on:2022    Depression Screening:  PHQ-2/PHQ-9 Depression Screening 2022   Retired Total Score -   Little Interest or Pleasure in Doing Things 0-->not at all   Feeling Down, Depressed or Hopeless 0-->not at all   PHQ-9: Brief Depression Severity Measure Score 0       Health Habits and Functional and Cognitive Screening:  Functional & Cognitive Status 2022   Do you have difficulty preparing food and eating? No   Do you have difficulty bathing yourself, getting dressed or grooming yourself? No   Do you have difficulty using the toilet? No   Do you have difficulty moving " around from place to place? No   Do you have trouble with steps or getting out of a bed or a chair? No   Current Diet Well Balanced Diet   Dental Exam Up to date   Eye Exam Up to date   Exercise (times per week) 7 times per week   Current Exercises Include Walking   Current Exercise Activities Include -   Do you need help using the phone?  No   Are you deaf or do you have serious difficulty hearing?  Yes   Do you need help with transportation? No   Do you need help shopping? No   Do you need help preparing meals?  No   Do you need help with housework?  No   Do you need help with laundry? No   Do you need help taking your medications? No   Do you need help managing money? No   Do you ever drive or ride in a car without wearing a seat belt? No   Have you felt unusual stress, anger or loneliness in the last month? No   Who do you live with? Spouse   If you need help, do you have trouble finding someone available to you? No   Have you been bothered in the last four weeks by sexual problems? (No Data)   Do you have difficulty concentrating, remembering or making decisions? No       Age-appropriate Screening Schedule:  Refer to the list below for future screening recommendations based on patient's age, sex and/or medical conditions. Orders for these recommended tests are listed in the plan section. The patient has been provided with a written plan.    Health Maintenance   Topic Date Due   • MAMMOGRAM  05/16/2020   • HEMOGLOBIN A1C  07/22/2021   • LIPID PANEL  01/22/2022   • URINE MICROALBUMIN  01/26/2022   • INFLUENZA VACCINE  10/01/2022   • DIABETIC EYE EXAM  11/22/2022   • DXA SCAN  07/08/2023   • TDAP/TD VACCINES (2 - Td or Tdap) 12/22/2026   • ZOSTER VACCINE  Addressed              Assessment & Plan   CMS Preventative Services Quick Reference  Risk Factors Identified During Encounter  Cardiovascular Disease  Chronic Pain   Fall Risk-High or Moderate  Immunizations Discussed/Encouraged (specific Immunizations;  Influenza  Polypharmacy   The above risks/problems have been discussed with the patient.  Follow up actions/plans if indicated are seen below in the Assessment/Plan Section.  Pertinent information has been shared with the patient in the After Visit Summary.    Diagnoses and all orders for this visit:    1. Encounter for subsequent annual wellness visit (AWV) in Medicare patient (Primary)    2. Type 2 diabetes mellitus with unspecified complications (HCC)  -     CBC & Differential  -     Comprehensive metabolic panel  -     Ferritin  -     Hemoglobin A1c  -     Microalbumin / Creatinine Urine Ratio - Urine, Clean Catch    3. Elevated cholesterol  -     Lipid panel    4. Iron deficiency anemia, unspecified iron deficiency anemia type  -     Ferritin    5. Paroxysmal atrial flutter (HCC)  -     CBC & Differential  -     Comprehensive metabolic panel        Follow Up:   Follow-up annually and as needed  Send questions or concerns via [facility]  Education attached to AVS for preventative care  No follow-ups on file.     An After Visit Summary and PPPS were made available to the patient.

## 2022-07-07 LAB
ALBUMIN SERPL-MCNC: 4.2 G/DL (ref 3.6–4.6)
ALBUMIN/CREAT UR: 30 MG/G CREAT (ref 0–29)
ALBUMIN/GLOB SERPL: 1.4 {RATIO} (ref 1.2–2.2)
ALP SERPL-CCNC: 105 IU/L (ref 44–121)
ALT SERPL-CCNC: 16 IU/L (ref 0–32)
AST SERPL-CCNC: 19 IU/L (ref 0–40)
BASOPHILS # BLD AUTO: 0 X10E3/UL (ref 0–0.2)
BASOPHILS NFR BLD AUTO: 0 %
BILIRUB SERPL-MCNC: 0.4 MG/DL (ref 0–1.2)
BUN SERPL-MCNC: 14 MG/DL (ref 8–27)
BUN/CREAT SERPL: 21 (ref 12–28)
CALCIUM SERPL-MCNC: 9.6 MG/DL (ref 8.7–10.3)
CHLORIDE SERPL-SCNC: 102 MMOL/L (ref 96–106)
CHOLEST SERPL-MCNC: 221 MG/DL (ref 100–199)
CO2 SERPL-SCNC: 27 MMOL/L (ref 20–29)
CREAT SERPL-MCNC: 0.66 MG/DL (ref 0.57–1)
CREAT UR-MCNC: 27 MG/DL
EGFRCR SERPLBLD CKD-EPI 2021: 88 ML/MIN/1.73
EOSINOPHIL # BLD AUTO: 0.1 X10E3/UL (ref 0–0.4)
EOSINOPHIL NFR BLD AUTO: 1 %
ERYTHROCYTE [DISTWIDTH] IN BLOOD BY AUTOMATED COUNT: 15.2 % (ref 11.7–15.4)
FERRITIN SERPL-MCNC: 20 NG/ML (ref 15–150)
GLOBULIN SER CALC-MCNC: 3.1 G/DL (ref 1.5–4.5)
GLUCOSE SERPL-MCNC: 110 MG/DL (ref 65–99)
HBA1C MFR BLD: 6.5 % (ref 4.8–5.6)
HCT VFR BLD AUTO: 43.6 % (ref 34–46.6)
HDLC SERPL-MCNC: 43 MG/DL
HGB BLD-MCNC: 13.6 G/DL (ref 11.1–15.9)
IMM GRANULOCYTES # BLD AUTO: 0 X10E3/UL (ref 0–0.1)
IMM GRANULOCYTES NFR BLD AUTO: 0 %
LDLC SERPL CALC-MCNC: 138 MG/DL (ref 0–99)
LYMPHOCYTES # BLD AUTO: 3.4 X10E3/UL (ref 0.7–3.1)
LYMPHOCYTES NFR BLD AUTO: 32 %
MCH RBC QN AUTO: 25.6 PG (ref 26.6–33)
MCHC RBC AUTO-ENTMCNC: 31.2 G/DL (ref 31.5–35.7)
MCV RBC AUTO: 82 FL (ref 79–97)
MICROALBUMIN UR-MCNC: 8.2 UG/ML
MONOCYTES # BLD AUTO: 0.7 X10E3/UL (ref 0.1–0.9)
MONOCYTES NFR BLD AUTO: 6 %
NEUTROPHILS # BLD AUTO: 6.4 X10E3/UL (ref 1.4–7)
NEUTROPHILS NFR BLD AUTO: 61 %
PLATELET # BLD AUTO: 340 X10E3/UL (ref 150–450)
POTASSIUM SERPL-SCNC: 4.9 MMOL/L (ref 3.5–5.2)
PROT SERPL-MCNC: 7.3 G/DL (ref 6–8.5)
RBC # BLD AUTO: 5.31 X10E6/UL (ref 3.77–5.28)
SODIUM SERPL-SCNC: 143 MMOL/L (ref 134–144)
TRIGL SERPL-MCNC: 221 MG/DL (ref 0–149)
VLDLC SERPL CALC-MCNC: 40 MG/DL (ref 5–40)
WBC # BLD AUTO: 10.6 X10E3/UL (ref 3.4–10.8)

## 2022-08-01 RX ORDER — NADOLOL 80 MG/1
80 TABLET ORAL DAILY
Qty: 90 TABLET | Refills: 2 | Status: SHIPPED | OUTPATIENT
Start: 2022-08-01

## 2022-11-28 ENCOUNTER — TRANSCRIBE ORDERS (OUTPATIENT)
Dept: ADMINISTRATIVE | Facility: HOSPITAL | Age: 82
End: 2022-11-28

## 2022-11-28 DIAGNOSIS — Z13.9 SCREENING DUE: Primary | ICD-10-CM

## 2023-01-03 RX ORDER — APIXABAN 5 MG/1
TABLET, FILM COATED ORAL
Qty: 180 TABLET | Refills: 0 | Status: SHIPPED | OUTPATIENT
Start: 2023-01-03 | End: 2023-04-03

## 2023-01-20 ENCOUNTER — OFFICE VISIT (OUTPATIENT)
Dept: CARDIOLOGY | Facility: CLINIC | Age: 83
End: 2023-01-20
Payer: MEDICARE

## 2023-01-20 VITALS
WEIGHT: 146 LBS | HEART RATE: 85 BPM | DIASTOLIC BLOOD PRESSURE: 90 MMHG | SYSTOLIC BLOOD PRESSURE: 136 MMHG | HEIGHT: 63 IN | BODY MASS INDEX: 25.87 KG/M2

## 2023-01-20 DIAGNOSIS — I48.21 PERMANENT ATRIAL FIBRILLATION: Primary | ICD-10-CM

## 2023-01-20 PROBLEM — I34.0 NONRHEUMATIC MITRAL VALVE REGURGITATION: Status: ACTIVE | Noted: 2023-01-20

## 2023-01-20 PROCEDURE — 93000 ELECTROCARDIOGRAM COMPLETE: CPT | Performed by: INTERNAL MEDICINE

## 2023-01-20 PROCEDURE — 99214 OFFICE O/P EST MOD 30 MIN: CPT | Performed by: INTERNAL MEDICINE

## 2023-01-20 RX ORDER — ESTRADIOL 0.1 MG/G
CREAM VAGINAL
COMMUNITY
Start: 2022-11-30

## 2023-01-20 NOTE — PROGRESS NOTES
Date of Office Visit: 2023  Encounter Provider: Jorge Monteiro MD  Place of Service: [facility] CARDIOLOGY  Patient Name: Jessica Davies  : 1940    Subjective:     Encounter Date:2023      Patient ID: Jessica Davies is a 82 y.o. female who has a cc of  Perm AF and is here for fu         The patient had a good year.   No anginal chest pain,   No sig hernandez,   No soa,   No fainting,  No orthostasis.   No edema.   Exercise tolerance: decent     There have been no hospital admission since the last visit.     There have been no bleeding events.       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   • Cancer (HCC)     Breast   • Chest pain    • DDD (degenerative disc disease), cervical     Neck and Lumbar   • Diabetes mellitus type 2, controlled, without complications (HCC)    • Disease of thyroid gland    • Early satiety    • Esophageal reflux    • Heartburn    • Hyperlipidemia    • Low back pain    • Stroke (HCC)     TIA       Social History     Socioeconomic History   • Marital status:    • Number of children: 2   • Years of education: [employer]   Tobacco Use   • Smoking status: Former     Packs/day: 3.00     Types: Cigarettes     Start date:      Quit date:      Years since quittin.0   • Smokeless tobacco: Never   • Tobacco comments:     quit 40 yrs ago   Vaping Use   • Vaping Use: Never used   Substance and Sexual Activity   • Alcohol use: Not Currently     Alcohol/week: 1.0 standard drink     Types: 1 Glasses of wine per week     Comment: once a month   • Drug use: No   • Sexual activity: Not Currently     Partners: Male     Comment:        Family History   Problem Relation Age of Onset   • Early death Mother    • Alcohol abuse Mother    • Asthma Mother    • Heart attack Mother    • Heart  "failure Mother    • Cancer Father         Prostate   • Heart disease Father    • Heart attack Father    • Prostate cancer Father 70   • Hyperlipidemia Sister    • Diabetes Paternal Grandmother    • Diabetes Paternal Grandfather    • Heart failure Maternal Aunt    • Brain cancer Maternal Aunt    • Asthma Paternal Aunt    • Colon cancer Neg Hx    • Colon polyps Neg Hx    • Crohn's disease Neg Hx    • Irritable bowel syndrome Neg Hx    • Ulcerative colitis Neg Hx        Review of Systems   Constitutional: Negative for fever and night sweats.   HENT: Negative for ear pain and stridor.    Eyes: Negative for discharge and visual halos.   Cardiovascular: Negative for cyanosis.   Respiratory: Negative for hemoptysis and sputum production.    Hematologic/Lymphatic: Negative for adenopathy.   Skin: Negative for nail changes and unusual hair distribution.   Musculoskeletal: Negative for gout and joint swelling.   Gastrointestinal: Negative for bowel incontinence and flatus.   Genitourinary: Negative for dysuria and flank pain.   Neurological: Negative for seizures and tremors.   Psychiatric/Behavioral: Negative for altered mental status. The patient is not nervous/anxious.             Objective:     Vitals:    01/20/23 1131   BP: 136/90   Pulse: 85   Weight: 66.2 kg (146 lb)   Height: 160 cm (63\")         Eyes:      General:         Right eye: No discharge.         Left eye: No discharge.   HENT:      Head: Normocephalic and atraumatic.   Neck:      Thyroid: No thyromegaly.      Vascular: No JVD.   Pulmonary:      Effort: Pulmonary effort is normal.      Breath sounds: Normal breath sounds. No rales.   Cardiovascular:      Normal rate. Irregularly irregular rhythm.      No gallop.   Edema:     Peripheral edema absent.   Abdominal:      General: Bowel sounds are normal.      Palpations: Abdomen is soft.      Tenderness: There is no abdominal tenderness.   Musculoskeletal: Normal range of motion.         General: No deformity. " Skin:     General: Skin is warm and dry.      Findings: No erythema.   Neurological:      Mental Status: Alert and oriented to person, place, and time.      Motor: Normal muscle tone.   Psychiatric:         Behavior: Behavior normal.         Thought Content: Thought content normal.           ECG 12 Lead    Date/Time: 1/20/2023 11:42 AM  Performed by: Jorge Monteiro MD  Authorized by: Jorge Monteiro MD   Comparison: compared with previous ECG   Similar to previous ECG  Rhythm: atrial fibrillation            Lab Review:       Assessment:          Diagnosis Plan   1. Permanent atrial fibrillation (HCC)  ECG 12 Lead    Digoxin Level             Plan:     AF -- rate controlled on dig and nadolol.     She is on apixaban. And wants to go on a lower dose. But she does not meet the critieria.        MR -- I could not hear a murmur easily today     Will check her by exam in 6 months

## 2023-03-14 ENCOUNTER — HOSPITAL ENCOUNTER (OUTPATIENT)
Dept: CARDIOLOGY | Facility: HOSPITAL | Age: 83
Discharge: HOME | End: 2023-03-14

## 2023-03-14 ENCOUNTER — HOSPITAL ENCOUNTER (OUTPATIENT)
Dept: CT IMAGING | Facility: HOSPITAL | Age: 83
Discharge: HOME | End: 2023-03-14

## 2023-03-14 DIAGNOSIS — Z13.9 SCREENING DUE: ICD-10-CM

## 2023-03-14 PROCEDURE — 75571 CT HRT W/O DYE W/CA TEST: CPT

## 2023-03-14 PROCEDURE — 93799 UNLISTED CV SVC/PROCEDURE: CPT

## 2023-03-15 LAB
BH CV XLRA MEAS - MID AO DIAM: 1.6 CM
BH CV XLRA MEAS - PAD LEFT ABI PT: 1.1
BH CV XLRA MEAS - PAD LEFT ARM: 128 MMHG
BH CV XLRA MEAS - PAD LEFT LEG PT: 152 MMHG
BH CV XLRA MEAS - PAD RIGHT ABI PT: 1.08
BH CV XLRA MEAS - PAD RIGHT ARM: 138 MMHG
BH CV XLRA MEAS - PAD RIGHT LEG PT: 149 MMHG
BH CV XLRA MEAS LEFT DIST CCA EDV: -16.2 CM/SEC
BH CV XLRA MEAS LEFT DIST CCA PSV: -70.8 CM/SEC
BH CV XLRA MEAS LEFT ICA/CCA RATIO: 0.7
BH CV XLRA MEAS LEFT MID CCA PSV: 71 CM/SEC
BH CV XLRA MEAS LEFT MID ICA PSV: 47 CM/SEC
BH CV XLRA MEAS LEFT PROX ICA EDV: -16.8 CM/SEC
BH CV XLRA MEAS LEFT PROX ICA PSV: -46.6 CM/SEC
BH CV XLRA MEAS RIGHT DIST CCA EDV: -21.7 CM/SEC
BH CV XLRA MEAS RIGHT DIST CCA PSV: -65.9 CM/SEC
BH CV XLRA MEAS RIGHT ICA/CCA RATIO: 0.9
BH CV XLRA MEAS RIGHT MID CCA PSV: 66 CM/SEC
BH CV XLRA MEAS RIGHT MID ICA PSV: 57 CM/SEC
BH CV XLRA MEAS RIGHT PROX ICA EDV: -18.6 CM/SEC
BH CV XLRA MEAS RIGHT PROX ICA PSV: -56.5 CM/SEC
MAXIMAL PREDICTED HEART RATE: 138 BPM
STRESS TARGET HR: 117 BPM

## 2023-04-03 RX ORDER — APIXABAN 5 MG/1
TABLET, FILM COATED ORAL
Qty: 180 TABLET | Refills: 1 | Status: SHIPPED | OUTPATIENT
Start: 2023-04-03

## 2023-05-08 RX ORDER — NADOLOL 80 MG/1
80 TABLET ORAL DAILY
Qty: 90 TABLET | Refills: 0 | Status: SHIPPED | OUTPATIENT
Start: 2023-05-08

## 2023-09-18 ENCOUNTER — OFFICE VISIT (OUTPATIENT)
Dept: CARDIOLOGY | Facility: CLINIC | Age: 83
End: 2023-09-18
Payer: MEDICARE

## 2023-09-18 VITALS
DIASTOLIC BLOOD PRESSURE: 84 MMHG | HEART RATE: 80 BPM | WEIGHT: 153 LBS | SYSTOLIC BLOOD PRESSURE: 128 MMHG | HEIGHT: 63 IN | BODY MASS INDEX: 27.11 KG/M2

## 2023-09-18 DIAGNOSIS — R06.09 DYSPNEA ON EXERTION: ICD-10-CM

## 2023-09-18 DIAGNOSIS — I48.21 PERMANENT ATRIAL FIBRILLATION: Primary | ICD-10-CM

## 2023-09-18 PROCEDURE — 1160F RVW MEDS BY RX/DR IN RCRD: CPT | Performed by: INTERNAL MEDICINE

## 2023-09-18 PROCEDURE — 1159F MED LIST DOCD IN RCRD: CPT | Performed by: INTERNAL MEDICINE

## 2023-09-18 PROCEDURE — 93000 ELECTROCARDIOGRAM COMPLETE: CPT | Performed by: INTERNAL MEDICINE

## 2023-09-18 PROCEDURE — 99214 OFFICE O/P EST MOD 30 MIN: CPT | Performed by: INTERNAL MEDICINE

## 2023-09-18 RX ORDER — SPIRONOLACTONE 25 MG/1
25 TABLET ORAL DAILY
Qty: 30 TABLET | Refills: 11 | Status: SHIPPED | OUTPATIENT
Start: 2023-09-18

## 2023-09-18 NOTE — PROGRESS NOTES
Date of Office Visit: 2023  Encounter Provider: Jorge Monteiro MD  Place of Service: [facility] CARDIOLOGY  Patient Name: Jessica Davies  : 1940    Subjective:     Encounter Date:2023      Patient ID: Jessica Davies is a 83 y.o. female who has a cc of  perm AF     More hernandez and dyspnea on bending over     Gradual in onset.     No anginal chest pain,      No fainting,  No orthostasis.   No edema.   Exercise tolerance:     There have been no hospital admission since the last visit.     There have been no bleeding events.       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    Cancer     Breast    Chest pain     DDD (degenerative disc disease), cervical     Neck and Lumbar    Diabetes mellitus type 2, controlled, without complications     Disease of thyroid gland     Early satiety     Esophageal reflux     Heartburn     Hyperlipidemia     Low back pain     Stroke     TIA       Social History     Socioeconomic History    Marital status:     Number of children: 2    Years of education: [employer]   Tobacco Use    Smoking status: Former     Packs/day: 3.00     Types: Cigarettes     Start date:      Quit date:      Years since quittin.7    Smokeless tobacco: Never    Tobacco comments:     quit 40 yrs ago   Vaping Use    Vaping Use: Never used   Substance and Sexual Activity    Alcohol use: Not Currently     Alcohol/week: 1.0 standard drink     Types: 1 Glasses of wine per week     Comment: once a month    Drug use: No    Sexual activity: Not Currently     Partners: Male     Comment:        Family History   Problem Relation Age of Onset    Early death Mother     Alcohol abuse Mother     Asthma Mother     Heart attack Mother     Heart failure Mother     Cancer Father         Prostate    Heart disease Father      "Heart attack Father     Prostate cancer Father 70    Hyperlipidemia Sister     Diabetes Paternal Grandmother     Diabetes Paternal Grandfather     Heart failure Maternal Aunt     Brain cancer Maternal Aunt     Asthma Paternal Aunt     Colon cancer Neg Hx     Colon polyps Neg Hx     Crohn's disease Neg Hx     Irritable bowel syndrome Neg Hx     Ulcerative colitis Neg Hx        Review of Systems   Constitutional: Negative for fever and night sweats.   HENT:  Negative for ear pain and stridor.    Eyes:  Negative for discharge and visual halos.   Cardiovascular:  Negative for cyanosis.   Respiratory:  Negative for hemoptysis and sputum production.    Hematologic/Lymphatic: Negative for adenopathy.   Skin:  Negative for nail changes and unusual hair distribution.   Musculoskeletal:  Negative for gout and joint swelling.   Gastrointestinal:  Negative for bowel incontinence and flatus.   Genitourinary:  Negative for dysuria and flank pain.   Neurological:  Negative for seizures and tremors.   Psychiatric/Behavioral:  Negative for altered mental status. The patient is not nervous/anxious.           Objective:     Vitals:    09/18/23 1115   BP: 128/84   Pulse: 80   Weight: 69.4 kg (153 lb)   Height: 160 cm (63\")         Eyes:      General:         Right eye: No discharge.         Left eye: No discharge.   HENT:      Head: Normocephalic and atraumatic.   Neck:      Thyroid: No thyromegaly.      Vascular: No JVD.   Pulmonary:      Effort: Pulmonary effort is normal.      Breath sounds: Normal breath sounds. No rales.   Cardiovascular:      Normal rate. Irregularly irregular rhythm.      No gallop.    Edema:     Peripheral edema absent.   Abdominal:      General: Bowel sounds are normal.      Palpations: Abdomen is soft.      Tenderness: There is no abdominal tenderness.   Musculoskeletal: Normal range of motion.         General: No deformity. Skin:     General: Skin is warm and dry.      Findings: No erythema.   Neurological: "      Mental Status: Alert and oriented to person, place, and time.      Motor: Normal muscle tone.   Psychiatric:         Behavior: Behavior normal.         Thought Content: Thought content normal.         ECG 12 Lead    Date/Time: 9/18/2023 11:45 AM  Performed by: Jorge Monteiro MD  Authorized by: Jorge Monteiro MD   Comparison: compared with previous ECG   Similar to previous ECG  Rhythm: atrial fibrillation        Lab Review:       Assessment:          Diagnosis Plan   1. Permanent atrial fibrillation        2. Dyspnea on exertion               Plan:     AF -- well controlled = and on a-ban     MERINO -- will try a little spironolactone

## 2023-10-03 ENCOUNTER — LAB (OUTPATIENT)
Dept: LAB | Facility: HOSPITAL | Age: 83
End: 2023-10-03
Payer: MEDICARE

## 2023-10-03 DIAGNOSIS — I48.21 PERMANENT ATRIAL FIBRILLATION: ICD-10-CM

## 2023-10-03 LAB
ANION GAP SERPL CALCULATED.3IONS-SCNC: 10.6 MMOL/L (ref 5–15)
BUN SERPL-MCNC: 17 MG/DL (ref 8–23)
BUN/CREAT SERPL: 22.7 (ref 7–25)
CALCIUM SPEC-SCNC: 9.5 MG/DL (ref 8.6–10.5)
CHLORIDE SERPL-SCNC: 102 MMOL/L (ref 98–107)
CO2 SERPL-SCNC: 26.4 MMOL/L (ref 22–29)
CREAT SERPL-MCNC: 0.75 MG/DL (ref 0.57–1)
EGFRCR SERPLBLD CKD-EPI 2021: 79.1 ML/MIN/1.73
GLUCOSE SERPL-MCNC: 122 MG/DL (ref 65–99)
POTASSIUM SERPL-SCNC: 4.7 MMOL/L (ref 3.5–5.2)
SODIUM SERPL-SCNC: 139 MMOL/L (ref 136–145)

## 2023-10-03 PROCEDURE — 80048 BASIC METABOLIC PNL TOTAL CA: CPT

## 2023-10-03 PROCEDURE — 36415 COLL VENOUS BLD VENIPUNCTURE: CPT

## 2023-10-05 RX ORDER — NADOLOL 80 MG/1
80 TABLET ORAL DAILY
Qty: 90 TABLET | Refills: 1 | Status: SHIPPED | OUTPATIENT
Start: 2023-10-05

## 2023-10-05 RX ORDER — APIXABAN 5 MG/1
TABLET, FILM COATED ORAL
Qty: 180 TABLET | Refills: 1 | Status: SHIPPED | OUTPATIENT
Start: 2023-10-05

## 2023-10-06 ENCOUNTER — TELEPHONE (OUTPATIENT)
Age: 83
End: 2023-10-06
Payer: MEDICARE

## 2023-10-06 NOTE — TELEPHONE ENCOUNTER
It is a fairly common side effect ---if it is significant/severe then she can stop it.    You can ask her if it helped her shortness of breath at all, if it did we could try replacing it with a loop diuretic.

## 2023-10-06 NOTE — TELEPHONE ENCOUNTER
Pt called to let you know since she has been taking the spironolactone her breasts have become more and more tender. She wanted to know if she should continue to take...Jessica

## 2023-10-29 ENCOUNTER — TELEPHONE (OUTPATIENT)
Dept: CARDIOLOGY | Facility: CLINIC | Age: 83
End: 2023-10-29
Payer: MEDICARE

## 2023-10-29 NOTE — TELEPHONE ENCOUNTER
Patient called the on call service last night to report vaginal bleeding, mostly noted when wiping. She is on Eliquis and was wondering what to do. Of note, she recently started hormone replacement therapy. Upon further discussion, she also endorsed worsening fatigue and weakness. Ultimately I advised her to go to the ER because I felt like she needed labs to rule out acute blood loss. I also told her she would need to follow up with the GYN prescribing the hormones.     It does not look like she went to the ED. Will someone please check on her?

## 2023-10-30 ENCOUNTER — TELEPHONE (OUTPATIENT)
Dept: GASTROENTEROLOGY | Facility: CLINIC | Age: 83
End: 2023-10-30

## 2023-10-30 NOTE — TELEPHONE ENCOUNTER
Spoke to patient. She said she is still bleeding, but it is like she is on her menstrual cycle she states. She spoke to her gyno and they believe she is having breakthrough bleeding d/t hormone therapy. She is going to see them either today or tomorrow. She is wondering if she should still continue taking her eliquis?    Jennifer Chiang RN  [place] [facility] [place]

## 2023-10-30 NOTE — TELEPHONE ENCOUNTER
"    Caller: Jessica Davies \"PAT\"     Relationship: [unfilled]     Best call back number: [phone]     What is your medical concern? STOMACH BURNING & NAUSEA    How long has this issue been going on? 10/21/23    Is your provider already aware of this issue? NO    Have you been treated for this issue? YES BUT IT IS WORSE. PLEASE FOLLOW UP WITH PT FOR FURTHER TREATMENT    "

## 2023-10-30 NOTE — TELEPHONE ENCOUNTER
Notified patient of recommendations. Patient verbalized understanding.    Jennifer Chiang RN  Triage [facility] [place]

## 2023-10-31 NOTE — TELEPHONE ENCOUNTER
RN called to get more information from patient and also scheduled follow-up. Pt's last office visit . Pt reports worsening reflux symptoms. Pt taking 20 mg of Pepcid prior to bed and is having worsening reflux through out day. Pt had been prescribed famotidine in past by Dr. Hood but she reports she did not finish taking and prescription has . Pt has been scheduled for first available appointment in December. EL

## 2023-11-01 RX ORDER — OMEPRAZOLE 40 MG/1
40 CAPSULE, DELAYED RELEASE ORAL DAILY
Qty: 90 CAPSULE | Refills: 3 | Status: SHIPPED | OUTPATIENT
Start: 2023-11-01

## 2023-11-01 NOTE — TELEPHONE ENCOUNTER
"    Hub staff attempted to follow warm transfer process and was unsuccessful     Caller: Jessica Davies \"PAT\"    Relationship to patient: Self    Best call back number: [phone]    Patient is needing: PATIENT MISSED CALL FROM DARRYL.  PLEASE REACH BACK OUT. THANK YOU   "

## 2023-11-01 NOTE — TELEPHONE ENCOUNTER
RN called and left voicemail for patient regarding new recommendations and to get pharmacy information. EL

## 2023-11-01 NOTE — TELEPHONE ENCOUNTER
RN called and was able to leave message with patient's spouse, Yovanny. Pt's pharmacy is correct in [place] ([facility] on [address]). Please send prescription. EL

## 2023-11-07 ENCOUNTER — TELEPHONE (OUTPATIENT)
Dept: GASTROENTEROLOGY | Facility: CLINIC | Age: 83
End: 2023-11-07
Payer: MEDICARE

## 2023-11-10 ENCOUNTER — TELEPHONE (OUTPATIENT)
Dept: GASTROENTEROLOGY | Facility: CLINIC | Age: 83
End: 2023-11-10
Payer: MEDICARE

## 2023-11-10 NOTE — TELEPHONE ENCOUNTER
"HUB UNABLE TO WARM TRANSFER     Caller: Jessica Davies \"PAT\"    Relationship: Self    Best call back number: [phone]     What is the best time to reach you: ASAP    Who are you requesting to speak with (clinical staff, provider,  specific staff member): STEPHANY     What was the call regarding: PATIENT STATES THAT SHE NEEDS TO SPEAK WITH STEPHANY REGARDING IF SHE NEEDS TO SCHEDULE AN OFFICE VISIT OR A SCOPE. PLEASE CALL BACK TO ADVISE.       "

## 2023-11-13 NOTE — TELEPHONE ENCOUNTER
Returned call and had to leave a message for her to make an office visit f/u and then we would schedule her scope if needed

## 2023-12-07 ENCOUNTER — TELEPHONE (OUTPATIENT)
Dept: CARDIOLOGY | Facility: CLINIC | Age: 83
End: 2023-12-07
Payer: MEDICARE

## 2023-12-07 NOTE — TELEPHONE ENCOUNTER
Patient is calling requesting that we change her diuretic. She states the spironolactone is causing her breast to be tender and that makes her feel very uneasy since she has a history of breast cancer. She said she read on the bottle that this is a side effect from this medication.     Jennifer Chiang RN  Triage [facility] [place]

## 2023-12-08 NOTE — TELEPHONE ENCOUNTER
I called and spoke with her--she does not feel like the spironolactone made a difference in her MERINO and it has caused breast tenderness so she is going to stop it which I think is okay--but says that she is still having MERINO--she does not think she has any LE swelling but some bloating.     I hesitate to put her on a loop diuretic without seeing her.     She prefers to see Dr. Monteiro rather than LEI--her appt is in May, will see if we can get her moved up to this month or sometime in January.    Instructed that if symptoms increased or became severe to go to ER or call us back, she verbalized understanding.

## 2023-12-12 ENCOUNTER — PREP FOR SURGERY (OUTPATIENT)
Dept: SURGERY | Facility: SURGERY CENTER | Age: 83
End: 2023-12-12
Payer: MEDICARE

## 2023-12-12 ENCOUNTER — OFFICE VISIT (OUTPATIENT)
Dept: GASTROENTEROLOGY | Facility: CLINIC | Age: 83
End: 2023-12-12
Payer: MEDICARE

## 2023-12-12 VITALS
HEART RATE: 112 BPM | BODY MASS INDEX: 26.88 KG/M2 | HEIGHT: 63 IN | TEMPERATURE: 97.5 F | OXYGEN SATURATION: 95 % | DIASTOLIC BLOOD PRESSURE: 70 MMHG | WEIGHT: 151.7 LBS | SYSTOLIC BLOOD PRESSURE: 118 MMHG

## 2023-12-12 DIAGNOSIS — R13.19 ESOPHAGEAL DYSPHAGIA: ICD-10-CM

## 2023-12-12 DIAGNOSIS — K44.9 HIATAL HERNIA: ICD-10-CM

## 2023-12-12 DIAGNOSIS — K62.89 ANAL OR RECTAL PAIN: ICD-10-CM

## 2023-12-12 DIAGNOSIS — K21.9 GASTROESOPHAGEAL REFLUX DISEASE, UNSPECIFIED WHETHER ESOPHAGITIS PRESENT: ICD-10-CM

## 2023-12-12 DIAGNOSIS — K21.9 GASTROESOPHAGEAL REFLUX DISEASE WITHOUT ESOPHAGITIS: Primary | ICD-10-CM

## 2023-12-12 DIAGNOSIS — K62.89 RECTAL PAIN: Primary | ICD-10-CM

## 2023-12-12 PROCEDURE — 99214 OFFICE O/P EST MOD 30 MIN: CPT | Performed by: INTERNAL MEDICINE

## 2023-12-12 RX ORDER — SODIUM CHLORIDE 0.9 % (FLUSH) 0.9 %
10 SYRINGE (ML) INJECTION AS NEEDED
OUTPATIENT
Start: 2023-12-12

## 2023-12-12 RX ORDER — PANTOPRAZOLE SODIUM 40 MG/1
40 TABLET, DELAYED RELEASE ORAL DAILY
Qty: 30 TABLET | Refills: 5 | Status: SHIPPED | OUTPATIENT
Start: 2023-12-12

## 2023-12-12 RX ORDER — SODIUM CHLORIDE 0.9 % (FLUSH) 0.9 %
3 SYRINGE (ML) INJECTION EVERY 12 HOURS SCHEDULED
OUTPATIENT
Start: 2023-12-12

## 2023-12-12 RX ORDER — SODIUM CHLORIDE, SODIUM LACTATE, POTASSIUM CHLORIDE, CALCIUM CHLORIDE 600; 310; 30; 20 MG/100ML; MG/100ML; MG/100ML; MG/100ML
30 INJECTION, SOLUTION INTRAVENOUS CONTINUOUS PRN
OUTPATIENT
Start: 2023-12-12

## 2023-12-12 NOTE — PATIENT INSTRUCTIONS
Schedule EGD & flexible sigmoidoscopy for further evaluation.    For GERD, may take pantoprazole as needed for symptoms. Recommend taking this around an hour before eating.    For GERD with hiatal hernia, follow antireflux precautions.  Recommend avoiding eating within 3 to 4 hours of bedtime.  Avoid foods that can trigger symptoms which may include citrus fruits, spicy foods, tomatoes and red sauces, chocolate, coffee/tea, caffeinated or carbonated beverages, alcohol.

## 2023-12-12 NOTE — PROGRESS NOTES
"Chief Complaint   Patient presents with    GI Problem           History of Present Illness  Patient today for follow-up regarding worsening reflux symptoms.  She had an EGD in 2021 showing 1 cm hiatal hernia and a Schatzki's ring which was dilated to 20 mm.  Prior esophageal biopsies showed no evidence of Carrillo's esophagus.    Patient reports she has had some issues with dysphagia. At times, feels food gets stuck in the esophagus which is painful but it will pass down.    She reports she recently went on vacation and when she returned had significant reflux issues. She tried omeprazole which seemed to cause nausea. She then started Pepcid which helped her symptoms. She reports when she monitors her diet and does not over eat, symptoms are generally controlled.    She reports for the last 5 years she has experienced intermittent rectal pain. This comes and goes. She reports when it occurs, it often radiates to the vaginal area. At times it is associated with the need to pass stool but at other times it does not. She reports this has been occurring more frequently over the last few months. She denies any rectal bleeding.           Result Review :       Progress Notes by Lottie Anthony APRN (08/03/2021 09:45)   UPPER GI ENDOSCOPY (07/06/2021 09:34)   Tissue Pathology Exam (07/06/2021 10:56)       Vital Signs:   /70   Pulse 112   Temp 97.5 °F (36.4 °C)   Ht 160 cm (63\")   Wt 68.8 kg (151 lb 11.2 oz)   SpO2 95%   BMI 26.87 kg/m²     Body mass index is 26.87 kg/m².     Physical Exam  Constitutional:       Appearance: Normal appearance.   Abdominal:      Palpations: Abdomen is soft.   Psychiatric:         Behavior: Behavior normal.           Assessment and Plan    Diagnoses and all orders for this visit:    1. Gastroesophageal reflux disease without esophagitis (Primary)    2. Hiatal hernia    3. Anal or rectal pain    Other orders  -     pantoprazole (PROTONIX) 40 MG EC tablet; Take 1 tablet by mouth " Daily.  Dispense: 30 tablet; Refill: 5         BRIEF SUMMARY  Patient today for follow-up.  She has new complaint of anal rectal pain which is infrequent and inconsistent but has occurred on several occasions.  She has had some thinning of the stool but no rectal bleeding.  We will schedule a flexible sigmoidoscopy for further evaluation.  She is also had recurrent episodes of solid food dysphagia and we will proceed with EGD with dilation.  Both these will be pending cardiology clearance due to her atrial fibrillation and shortness of breath.  The meantime we will utilize pantoprazole 40 mg before large meals.    I have reviewed and confirmed the accuracy of the HPI and Assessment and Plan as documented by the APRN LEI Oliva        Follow Up   Return for Follow up to review results after testing complete.    Patient Instructions   Schedule EGD & flexible sigmoidoscopy for further evaluation.    For GERD, may take pantoprazole as needed for symptoms. Recommend taking this around an hour before eating.    For GERD with hiatal hernia, follow antireflux precautions.  Recommend avoiding eating within 3 to 4 hours of bedtime.  Avoid foods that can trigger symptoms which may include citrus fruits, spicy foods, tomatoes and red sauces, chocolate, coffee/tea, caffeinated or carbonated beverages, alcohol.       Documentation by Charity ODOM acting as a scribe in the following sections on behalf of the billable provider: HPI, ROS, assessment, & plan.

## 2023-12-13 ENCOUNTER — TELEPHONE (OUTPATIENT)
Age: 83
End: 2023-12-13
Payer: MEDICARE

## 2023-12-13 NOTE — TELEPHONE ENCOUNTER
Received a fax from [facility]. Pt is scheduled for an EGD / Flex Sig 2/1/2024. They are asking if pt can move forward with procedure and if so can she hold her apixaban 5 days prior to. Pt has stroke history...Jessica

## 2024-01-19 ENCOUNTER — OFFICE VISIT (OUTPATIENT)
Age: 84
End: 2024-01-19
Payer: MEDICARE

## 2024-01-19 VITALS
SYSTOLIC BLOOD PRESSURE: 124 MMHG | BODY MASS INDEX: 26.22 KG/M2 | HEIGHT: 63 IN | WEIGHT: 148 LBS | DIASTOLIC BLOOD PRESSURE: 86 MMHG | HEART RATE: 91 BPM

## 2024-01-19 DIAGNOSIS — I48.21 PERMANENT ATRIAL FIBRILLATION: Primary | ICD-10-CM

## 2024-01-19 PROCEDURE — 93000 ELECTROCARDIOGRAM COMPLETE: CPT | Performed by: INTERNAL MEDICINE

## 2024-01-19 PROCEDURE — 99214 OFFICE O/P EST MOD 30 MIN: CPT | Performed by: INTERNAL MEDICINE

## 2024-01-19 RX ORDER — FAMOTIDINE 40 MG/1
40 TABLET, FILM COATED ORAL NIGHTLY
COMMUNITY

## 2024-01-19 RX ORDER — FUROSEMIDE 20 MG/1
20 TABLET ORAL DAILY
Qty: 30 TABLET | Refills: 6 | Status: SHIPPED | OUTPATIENT
Start: 2024-01-19

## 2024-01-19 NOTE — PROGRESS NOTES
Date of Office Visit: 2024  Encounter Provider: Jorge Monteiro MD  Place of Service: [facility] CARDIOLOGY  Patient Name: Jessica Davies  : 1940    Subjective:     Encounter Date:2024      Patient ID: Jessica Davies is a 83 y.o. female who has a cc of  perm AF and here for fu and has more hernandez --even more than she reported in Sept   Legs are weak   No anginal chest pain,     No fainting,  No orthostasis.   No edema.   Exercise tolerance: decreased -- she tried to do daily walking but her back hurts     There have been no hospital admission since the last visit.     There have been no bleeding events.       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    Cancer     Breast    Chest pain     DDD (degenerative disc disease), cervical     Neck and Lumbar    Diabetes mellitus type 2, controlled, without complications     Disease of thyroid gland     Early satiety     Esophageal reflux     Heartburn     Hyperlipidemia     Low back pain     Stroke     TIA       Social History     Socioeconomic History    Marital status:     Number of children: 2    Years of education: [employer]   Tobacco Use    Smoking status: Former     Packs/day: 3     Types: Cigarettes     Start date:      Quit date:      Years since quittin.0    Smokeless tobacco: Never    Tobacco comments:     quit 40 yrs ago   Vaping Use    Vaping Use: Never used   Substance and Sexual Activity    Alcohol use: Not Currently     Alcohol/week: 1.0 standard drink of alcohol     Types: 1 Glasses of wine per week    Drug use: No    Sexual activity: Not Currently     Partners: Male     Comment:        Family History   Problem Relation Age of Onset    Early death Mother     Alcohol abuse Mother     Asthma Mother     Heart attack Mother     Heart failure Mother  "    Cancer Father         Prostate    Heart disease Father     Heart attack Father     Prostate cancer Father 70    Hyperlipidemia Sister     Diabetes Paternal Grandmother     Diabetes Paternal Grandfather     Heart failure Maternal Aunt     Brain cancer Maternal Aunt     Asthma Paternal Aunt     Colon cancer Neg Hx     Colon polyps Neg Hx     Crohn's disease Neg Hx     Irritable bowel syndrome Neg Hx     Ulcerative colitis Neg Hx        Review of Systems   Constitutional: Negative for fever and night sweats.   HENT:  Negative for ear pain and stridor.    Eyes:  Negative for discharge and visual halos.   Cardiovascular:  Negative for cyanosis.   Respiratory:  Negative for hemoptysis and sputum production.    Hematologic/Lymphatic: Negative for adenopathy.   Skin:  Negative for nail changes and unusual hair distribution.   Musculoskeletal:  Positive for arthritis, back pain and joint pain. Negative for gout and joint swelling.   Gastrointestinal:  Negative for bowel incontinence and flatus.   Genitourinary:  Negative for dysuria and flank pain.   Neurological:  Negative for seizures and tremors.   Psychiatric/Behavioral:  Negative for altered mental status. The patient is not nervous/anxious.             Objective:     Vitals:    01/19/24 1324   BP: 124/86   Pulse: 91   Weight: 67.1 kg (148 lb)   Height: 160 cm (63\")         Eyes:      General:         Right eye: No discharge.         Left eye: No discharge.   HENT:      Head: Normocephalic and atraumatic.   Neck:      Thyroid: No thyromegaly.      Vascular: No JVD.   Pulmonary:      Effort: Pulmonary effort is normal.      Breath sounds: Normal breath sounds. No rales.   Cardiovascular:      Normal rate. Irregularly irregular rhythm.      No gallop.    Edema:     Peripheral edema absent.   Abdominal:      General: Bowel sounds are normal.      Palpations: Abdomen is soft.      Tenderness: There is no abdominal tenderness.   Musculoskeletal: Normal range of motion.   "       General: No deformity. Skin:     General: Skin is warm and dry.      Findings: No erythema.   Neurological:      Mental Status: Alert and oriented to person, place, and time.      Motor: Normal muscle tone.   Psychiatric:         Behavior: Behavior normal.         Thought Content: Thought content normal.           ECG 12 Lead    Date/Time: 1/19/2024 2:00 PM  Performed by: Jorge Monteiro MD    Authorized by: Jorge Monteiro MD  Comparison: compared with previous ECG   Similar to previous ECG  Rhythm: atrial fibrillation          Lab Review:       Assessment:          Diagnosis Plan   1. Permanent atrial fibrillation               Plan:     I think she has a bit HFpEF and will try a diuretic     AF is well controlled     She could not tolerate spironolactone

## 2024-01-29 NOTE — SIGNIFICANT NOTE
Education provided the Patient on the following:    - Nothing to Eat or Drink after MN the night before the procedure    - Avoid red/purple fluids while completing their bowel prep as ordered by physician  -Contact Gastrointerologist office for any questions about specific details regarding colon prep    -You will need to have someone drive you home after your colonoscopy and remain with you for 24 hours after the procedure  - The date of your Surgery, you may have one visitor at bedside or within 10-15 minutes of [facility]  -Please wear warm socks when you arrive for your colonoscopy  -Remove all jewelry and leave any valuables before arriving the day of your procedure (all will have to be removed before leaving preop)  -You will need to arrive at 1000 on 2-1-24 for your colonoscopy    -Feel free to contact us at: [phone] with any additional questions/concerns

## 2024-02-01 ENCOUNTER — ANESTHESIA (OUTPATIENT)
Dept: SURGERY | Facility: SURGERY CENTER | Age: 84
End: 2024-02-01
Payer: MEDICARE

## 2024-02-01 ENCOUNTER — ANESTHESIA EVENT (OUTPATIENT)
Dept: SURGERY | Facility: SURGERY CENTER | Age: 84
End: 2024-02-01
Payer: MEDICARE

## 2024-02-01 ENCOUNTER — HOSPITAL ENCOUNTER (OUTPATIENT)
Facility: SURGERY CENTER | Age: 84
Setting detail: HOSPITAL OUTPATIENT SURGERY
Discharge: HOME | End: 2024-02-01
Attending: INTERNAL MEDICINE | Admitting: INTERNAL MEDICINE
Payer: MEDICARE

## 2024-02-01 VITALS
BODY MASS INDEX: 26.04 KG/M2 | HEART RATE: 89 BPM | WEIGHT: 147 LBS | TEMPERATURE: 97.2 F | DIASTOLIC BLOOD PRESSURE: 56 MMHG | OXYGEN SATURATION: 96 % | SYSTOLIC BLOOD PRESSURE: 97 MMHG | RESPIRATION RATE: 16 BRPM

## 2024-02-01 DIAGNOSIS — K21.9 GASTROESOPHAGEAL REFLUX DISEASE, UNSPECIFIED WHETHER ESOPHAGITIS PRESENT: ICD-10-CM

## 2024-02-01 DIAGNOSIS — R13.19 ESOPHAGEAL DYSPHAGIA: ICD-10-CM

## 2024-02-01 DIAGNOSIS — K62.89 RECTAL PAIN: ICD-10-CM

## 2024-02-01 PROCEDURE — 45338 SIGMOIDOSCOPY W/TUMR REMOVE: CPT | Performed by: INTERNAL MEDICINE

## 2024-02-01 PROCEDURE — 25010000002 PROPOFOL 10 MG/ML EMULSION: Performed by: STUDENT IN AN ORGANIZED HEALTH CARE EDUCATION/TRAINING PROGRAM

## 2024-02-01 PROCEDURE — 25010000002 ONDANSETRON PER 1 MG: Performed by: STUDENT IN AN ORGANIZED HEALTH CARE EDUCATION/TRAINING PROGRAM

## 2024-02-01 PROCEDURE — C1726 CATH, BAL DIL, NON-VASCULAR: HCPCS | Performed by: INTERNAL MEDICINE

## 2024-02-01 PROCEDURE — 43249 ESOPH EGD DILATION <30 MM: CPT | Performed by: INTERNAL MEDICINE

## 2024-02-01 PROCEDURE — 25810000003 LACTATED RINGERS PER 1000 ML: Performed by: NURSE PRACTITIONER

## 2024-02-01 PROCEDURE — 43239 EGD BIOPSY SINGLE/MULTIPLE: CPT | Performed by: INTERNAL MEDICINE

## 2024-02-01 PROCEDURE — 25810000003 LACTATED RINGERS PER 1000 ML: Performed by: STUDENT IN AN ORGANIZED HEALTH CARE EDUCATION/TRAINING PROGRAM

## 2024-02-01 PROCEDURE — 88305 TISSUE EXAM BY PATHOLOGIST: CPT | Performed by: INTERNAL MEDICINE

## 2024-02-01 PROCEDURE — 25010000002 PROPOFOL 200 MG/20ML EMULSION: Performed by: STUDENT IN AN ORGANIZED HEALTH CARE EDUCATION/TRAINING PROGRAM

## 2024-02-01 RX ORDER — ONDANSETRON 2 MG/ML
4 INJECTION INTRAMUSCULAR; INTRAVENOUS ONCE AS NEEDED
Status: COMPLETED | OUTPATIENT
Start: 2024-02-01 | End: 2024-02-01

## 2024-02-01 RX ORDER — MAGNESIUM HYDROXIDE 1200 MG/15ML
LIQUID ORAL AS NEEDED
Status: DISCONTINUED | OUTPATIENT
Start: 2024-02-01 | End: 2024-02-01 | Stop reason: HOSPADM

## 2024-02-01 RX ORDER — EPHEDRINE SULFATE 50 MG/ML
INJECTION INTRAVENOUS AS NEEDED
Status: DISCONTINUED | OUTPATIENT
Start: 2024-02-01 | End: 2024-02-01 | Stop reason: SURG

## 2024-02-01 RX ORDER — SODIUM CHLORIDE 0.9 % (FLUSH) 0.9 %
3 SYRINGE (ML) INJECTION EVERY 12 HOURS SCHEDULED
Status: DISCONTINUED | OUTPATIENT
Start: 2024-02-01 | End: 2024-02-01 | Stop reason: HOSPADM

## 2024-02-01 RX ORDER — SODIUM CHLORIDE 0.9 % (FLUSH) 0.9 %
3-10 SYRINGE (ML) INJECTION AS NEEDED
Status: DISCONTINUED | OUTPATIENT
Start: 2024-02-01 | End: 2024-02-01 | Stop reason: HOSPADM

## 2024-02-01 RX ORDER — PROPOFOL 10 MG/ML
INJECTION, EMULSION INTRAVENOUS AS NEEDED
Status: DISCONTINUED | OUTPATIENT
Start: 2024-02-01 | End: 2024-02-01 | Stop reason: SURG

## 2024-02-01 RX ORDER — ONDANSETRON 2 MG/ML
4 INJECTION INTRAMUSCULAR; INTRAVENOUS EVERY 6 HOURS PRN
Status: DISCONTINUED | OUTPATIENT
Start: 2024-02-01 | End: 2024-02-01

## 2024-02-01 RX ORDER — LIDOCAINE HYDROCHLORIDE 20 MG/ML
INJECTION, SOLUTION EPIDURAL; INFILTRATION; INTRACAUDAL; PERINEURAL AS NEEDED
Status: DISCONTINUED | OUTPATIENT
Start: 2024-02-01 | End: 2024-02-01 | Stop reason: SURG

## 2024-02-01 RX ORDER — FAMOTIDINE 10 MG/ML
20 INJECTION, SOLUTION INTRAVENOUS ONCE
Status: COMPLETED | OUTPATIENT
Start: 2024-02-01 | End: 2024-02-01

## 2024-02-01 RX ORDER — SODIUM CHLORIDE, SODIUM LACTATE, POTASSIUM CHLORIDE, CALCIUM CHLORIDE 600; 310; 30; 20 MG/100ML; MG/100ML; MG/100ML; MG/100ML
30 INJECTION, SOLUTION INTRAVENOUS CONTINUOUS PRN
Status: DISCONTINUED | OUTPATIENT
Start: 2024-02-01 | End: 2024-02-01 | Stop reason: HOSPADM

## 2024-02-01 RX ORDER — SODIUM CHLORIDE, SODIUM LACTATE, POTASSIUM CHLORIDE, CALCIUM CHLORIDE 600; 310; 30; 20 MG/100ML; MG/100ML; MG/100ML; MG/100ML
INJECTION, SOLUTION INTRAVENOUS CONTINUOUS PRN
Status: DISCONTINUED | OUTPATIENT
Start: 2024-02-01 | End: 2024-02-01 | Stop reason: SURG

## 2024-02-01 RX ORDER — SODIUM CHLORIDE, SODIUM LACTATE, POTASSIUM CHLORIDE, CALCIUM CHLORIDE 600; 310; 30; 20 MG/100ML; MG/100ML; MG/100ML; MG/100ML
9 INJECTION, SOLUTION INTRAVENOUS CONTINUOUS
Status: DISCONTINUED | OUTPATIENT
Start: 2024-02-01 | End: 2024-02-01 | Stop reason: HOSPADM

## 2024-02-01 RX ORDER — FAMOTIDINE 10 MG/ML
20 INJECTION, SOLUTION INTRAVENOUS EVERY 12 HOURS SCHEDULED
Status: DISCONTINUED | OUTPATIENT
Start: 2024-02-01 | End: 2024-02-01

## 2024-02-01 RX ORDER — SODIUM CHLORIDE 0.9 % (FLUSH) 0.9 %
10 SYRINGE (ML) INJECTION AS NEEDED
Status: DISCONTINUED | OUTPATIENT
Start: 2024-02-01 | End: 2024-02-01 | Stop reason: HOSPADM

## 2024-02-01 RX ADMIN — LIDOCAINE HYDROCHLORIDE 30 MG: 20 INJECTION, SOLUTION EPIDURAL; INFILTRATION; INTRACAUDAL; PERINEURAL at 11:17

## 2024-02-01 RX ADMIN — SODIUM CHLORIDE, SODIUM LACTATE, POTASSIUM CHLORIDE, AND CALCIUM CHLORIDE: .6; .31; .03; .02 INJECTION, SOLUTION INTRAVENOUS at 11:16

## 2024-02-01 RX ADMIN — FAMOTIDINE 20 MG: 10 INJECTION, SOLUTION INTRAVENOUS at 10:54

## 2024-02-01 RX ADMIN — EPHEDRINE SULFATE 10 MG: 50 INJECTION INTRAVENOUS at 11:29

## 2024-02-01 RX ADMIN — ONDANSETRON 4 MG: 2 INJECTION INTRAMUSCULAR; INTRAVENOUS at 10:55

## 2024-02-01 RX ADMIN — PROPOFOL 50 MG: 10 INJECTION, EMULSION INTRAVENOUS at 11:17

## 2024-02-01 RX ADMIN — SODIUM CHLORIDE, POTASSIUM CHLORIDE, SODIUM LACTATE AND CALCIUM CHLORIDE 30 ML/HR: 600; 310; 30; 20 INJECTION, SOLUTION INTRAVENOUS at 10:50

## 2024-02-01 RX ADMIN — PROPOFOL 160 MCG/KG/MIN: 10 INJECTION, EMULSION INTRAVENOUS at 11:17

## 2024-02-01 NOTE — H&P
No chief complaint on file.      HPI  Patient a for an EGD due to dysphagia and a flexible sigmoidoscopy for evaluation of rectal pain.         Problem List:    Patient Active Problem List   Diagnosis    Cancer    Malignant neoplasm of breast    Spinal stenosis of cervical region    Congenital absence of fingers or toes    Osteopenia    Diabetes mellitus type 2, controlled, without complications    Herpes    Elevated cholesterol    Osteoarthritis of lumbar spine    Kyphoscoliosis and scoliosis    Hemispheric carotid artery syndrome    Restless legs syndrome (RLS)    Abnormal MRI, thoracic spine    Dyspnea on exertion    Multifocal atrial tachycardia    Paroxysmal atrial flutter    Anxiety    Iron deficiency anemia    Permanent atrial fibrillation    Gastroesophageal reflux disease    Dysphagia    Early satiety    Anticoagulated    Arthritis    Basal cell carcinoma of skin    DDD (degenerative disc disease), cervical    Multinodular goiter    Nonrheumatic aortic valve insufficiency    Personal history of breast cancer    Adactyly    Type 2 diabetes mellitus without complication    Iron deficiency anemia    Lumbar spondylosis    Restless legs syndrome (RLS)    Nonrheumatic mitral valve regurgitation    Rectal pain       Medical History:    Past Medical History:   Diagnosis Date    Abdominal pain, LUQ (left upper quadrant)     Anxiety 01/03/2019    Atrial fibrillation     Birth defect     Small L Hand-partially developed digits    Cancer     Breast    Chest pain     DDD (degenerative disc disease), cervical     Neck and Lumbar    Diabetes mellitus type 2, controlled, without complications     Disease of thyroid gland     Early satiety     Esophageal reflux     Heartburn     Hyperlipidemia     Low back pain     Stroke     TIA        Social History:    Social History     Socioeconomic History    Marital status:     Number of children: 2    Years of education: [employer]   Tobacco Use    Smoking status: Former      Packs/day: 3     Types: Cigarettes     Start date:      Quit date:      Years since quittin.1    Smokeless tobacco: Never    Tobacco comments:     quit 40 yrs ago   Vaping Use    Vaping Use: Never used   Substance and Sexual Activity    Alcohol use: Not Currently     Alcohol/week: 1.0 standard drink of alcohol     Types: 1 Glasses of wine per week    Drug use: No    Sexual activity: Not Currently     Partners: Male     Comment:        Family History:   Family History   Problem Relation Age of Onset    Early death Mother     Alcohol abuse Mother     Asthma Mother     Heart attack Mother     Heart failure Mother     Cancer Father         Prostate    Heart disease Father     Heart attack Father     Prostate cancer Father 70    Hyperlipidemia Sister     Diabetes Paternal Grandmother     Diabetes Paternal Grandfather     Heart failure Maternal Aunt     Brain cancer Maternal Aunt     Asthma Paternal Aunt     Colon cancer Neg Hx     Colon polyps Neg Hx     Crohn's disease Neg Hx     Irritable bowel syndrome Neg Hx     Ulcerative colitis Neg Hx        Surgical History:   Past Surgical History:   Procedure Laterality Date    BREAST LUMPECTOMY Right     BREAST LUMPECTOMY Left     CATARACT EXTRACTION      COLONOSCOPY      D & C AND LAPAROSCOPY      ENDOSCOPY N/A 2021    Procedure: ESOPHAGOGASTRODUODENOSCOPY WITH BALLOON DILATATION AT 18, 19, AND 20 WITH COLD BIOPSIES;  Surgeon: Cash Hood MD;  Location: [facility] ENDOSCOPY;  Service: Gastroenterology;  Laterality: N/A;  PRE- GERD, DYSPHAGIA  POST- HIATAL HERNIA, SCHATZKIS RING    MOHS SURGERY         No current facility-administered medications for this encounter.    Allergies:   Allergies   Allergen Reactions    Azithromycin Palpitations    Cholestatin Palpitations    Contrast Dye (Echo Or Unknown Ct/Mr) Itching    Rivaroxaban Other (See Comments)     Tongue tingling    Sulfamethoxazole-Trimethoprim Rash        The following portions  of the patient's history were reviewed by me and updated as appropriate: review of systems, allergies, current medications, past family history, past medical history, past social history, past surgical history and problem list.    There were no vitals filed for this visit.    PHYSICAL EXAM:    CONSTITUTIONAL:  today's vital signs reviewed by me  GASTROINTESTINAL: abdomen is soft nontender nondistended with normal active bowel sounds, no masses are appreciated    Assessment/ Plan  We proceeded with EGD and flexible sigmoidoscopy.    Risks and benefits as well as alternatives to endoscopic evaluation were explained to the patient and they voiced understanding and wish to proceed.  These risks include but are not limited to the risk of bleeding, perforation, adverse reaction to sedation, and missed lesions.  The patient was given the opportunity to ask questions prior to the endoscopic procedure.

## 2024-02-01 NOTE — ANESTHESIA POSTPROCEDURE EVALUATION
Patient: Jessica Davies    Procedure Summary       Date: 02/01/24 Room / Location: [facility]    Anesthesia Start: 1116 Anesthesia Stop: 1142    Procedures:       ESOPHAGOGASTRODUODENOSCOPY WITH DILATATION      FLEXIBLE SIGMOIDOSCOPY WITH BIOPSY Diagnosis:       Rectal pain      Gastroesophageal reflux disease, unspecified whether esophagitis present      Esophageal dysphagia      (Rectal pain [K62.89])      (Gastroesophageal reflux disease, unspecified whether esophagitis present [K21.9])      (Esophageal dysphagia [R13.19])    Surgeons: Cash Hood MD Provider: Ortiz Campuzano MD    Anesthesia Type: MAC ASA Status: 3            Anesthesia Type: MAC    Vitals  Vitals Value Taken Time   BP 97/51 02/01/24 1143   Temp 36.2 °C (97.2 °F) 02/01/24 1136   Pulse 90 02/01/24 1143   Resp 16 02/01/24 1140   SpO2 95 % 02/01/24 1143   Vitals shown include unfiled device data.        Post Anesthesia Care and Evaluation    Patient location during evaluation: bedside  Patient participation: complete - patient participated  Level of consciousness: awake and alert  Pain management: adequate    Airway patency: patent  Anesthetic complications: No anesthetic complications  PONV Status: controlled  Cardiovascular status: blood pressure returned to baseline and acceptable  Respiratory status: acceptable  Hydration status: acceptable

## 2024-02-01 NOTE — ANESTHESIA PREPROCEDURE EVALUATION
Anesthesia Evaluation     Patient summary reviewed and Nursing notes reviewed   no history of anesthetic complications:   NPO Solid Status: > 8 hours  NPO Liquid Status: > 2 hours           Airway   Dental      Pulmonary      ROS comment: Mild pHTN  Cardiovascular     (+) valvular problems/murmurs TI and MR, dysrhythmias Atrial Fib, Atrial Flutter      Neuro/Psych  (+) TIA  GI/Hepatic/Renal/Endo    (+) GERD, diabetes mellitus, thyroid problem hypothyroidism    Musculoskeletal     Abdominal    Substance History      OB/GYN          Other                          Anesthesia Plan    ASA 3     MAC     intravenous induction     Anesthetic plan, risks, benefits, and alternatives have been provided, discussed and informed consent has been obtained with: patient.        CODE STATUS:

## 2024-02-02 ENCOUNTER — LAB (OUTPATIENT)
Dept: LAB | Facility: HOSPITAL | Age: 84
End: 2024-02-02
Payer: MEDICARE

## 2024-02-02 DIAGNOSIS — I48.21 PERMANENT ATRIAL FIBRILLATION: ICD-10-CM

## 2024-02-02 LAB
ANION GAP SERPL CALCULATED.3IONS-SCNC: 9.8 MMOL/L (ref 5–15)
BUN SERPL-MCNC: 11 MG/DL (ref 8–23)
BUN/CREAT SERPL: 15.5 (ref 7–25)
CALCIUM SPEC-SCNC: 9.3 MG/DL (ref 8.6–10.5)
CHLORIDE SERPL-SCNC: 104 MMOL/L (ref 98–107)
CO2 SERPL-SCNC: 27.2 MMOL/L (ref 22–29)
CREAT SERPL-MCNC: 0.71 MG/DL (ref 0.57–1)
EGFRCR SERPLBLD CKD-EPI 2021: 84.5 ML/MIN/1.73
GLUCOSE SERPL-MCNC: 125 MG/DL (ref 65–99)
LAB AP CASE REPORT: NORMAL
PATH REPORT.FINAL DX SPEC: NORMAL
PATH REPORT.GROSS SPEC: NORMAL
POTASSIUM SERPL-SCNC: 4.6 MMOL/L (ref 3.5–5.2)
SODIUM SERPL-SCNC: 141 MMOL/L (ref 136–145)

## 2024-02-02 PROCEDURE — 36415 COLL VENOUS BLD VENIPUNCTURE: CPT

## 2024-02-02 PROCEDURE — 80048 BASIC METABOLIC PNL TOTAL CA: CPT

## 2024-04-03 ENCOUNTER — OFFICE VISIT (OUTPATIENT)
Dept: FAMILY MEDICINE CLINIC | Facility: CLINIC | Age: 84
End: 2024-04-03
Payer: MEDICARE

## 2024-04-03 VITALS
SYSTOLIC BLOOD PRESSURE: 134 MMHG | DIASTOLIC BLOOD PRESSURE: 88 MMHG | HEIGHT: 63 IN | HEART RATE: 50 BPM | BODY MASS INDEX: 26.05 KG/M2 | WEIGHT: 147 LBS | OXYGEN SATURATION: 98 % | RESPIRATION RATE: 18 BRPM

## 2024-04-03 DIAGNOSIS — Z12.31 ENCOUNTER FOR SCREENING MAMMOGRAM FOR MALIGNANT NEOPLASM OF BREAST: ICD-10-CM

## 2024-04-03 DIAGNOSIS — I48.21 PERMANENT ATRIAL FIBRILLATION: ICD-10-CM

## 2024-04-03 DIAGNOSIS — E78.00 ELEVATED CHOLESTEROL: ICD-10-CM

## 2024-04-03 DIAGNOSIS — Z00.00 MEDICARE ANNUAL WELLNESS VISIT, SUBSEQUENT: Primary | ICD-10-CM

## 2024-04-03 DIAGNOSIS — E11.9 DM TYPE 2, GOAL HBA1C 7%-8%: ICD-10-CM

## 2024-04-03 DIAGNOSIS — E11.9 TYPE 2 DIABETES MELLITUS WITHOUT COMPLICATION, WITHOUT LONG-TERM CURRENT USE OF INSULIN: ICD-10-CM

## 2024-04-03 DIAGNOSIS — D50.9 IRON DEFICIENCY ANEMIA, UNSPECIFIED IRON DEFICIENCY ANEMIA TYPE: ICD-10-CM

## 2024-04-03 PROBLEM — M47.816 LUMBAR SPONDYLOSIS: Status: ACTIVE | Noted: 2023-01-17

## 2024-04-03 PROBLEM — E06.3 AUTOIMMUNE THYROIDITIS: Status: ACTIVE | Noted: 2022-08-01

## 2024-04-03 PROBLEM — G89.29 CHRONIC LOW BACK PAIN: Status: ACTIVE | Noted: 2023-01-17

## 2024-04-03 PROBLEM — M47.816 ARTHROPATHY OF LUMBAR FACET JOINT: Status: ACTIVE | Noted: 2023-01-17

## 2024-04-03 PROBLEM — M54.50 CHRONIC LOW BACK PAIN: Status: ACTIVE | Noted: 2023-01-17

## 2024-04-03 NOTE — PATIENT INSTRUCTIONS
Medicare Wellness  Personal Prevention Plan of Service     Date of Office Visit:    Encounter Provider:  Mike Worrell MD  Place of Service:  [facility] PRIMARY CARE  Patient Name: Jessica Davies  :  1940    As part of the Medicare Wellness portion of your visit today, we are providing you with this personalized preventive plan of services (PPPS). This plan is based upon recommendations of the [place] Preventive Services Task Force (USPSTF) and the Advisory Committee on Immunization Practices (ACIP).    This lists the preventive care services that should be considered, and provides dates of when you are due. Items listed as completed are up-to-date and do not require any further intervention.    Health Maintenance   Topic Date Due    RSV Vaccine - Adults (1 - 1-dose 60+ series) Never done    BMI FOLLOWUP  07/10/2020    COLORECTAL CANCER SCREENING  10/01/2022    URINE MICROALBUMIN  2023    DXA SCAN  2023    COVID-19 Vaccine ( season) 2023    HEMOGLOBIN A1C  2024    INFLUENZA VACCINE  2024    LIPID PANEL  2024    DIABETIC EYE EXAM  2024    ANNUAL WELLNESS VISIT  2025    TDAP/TD VACCINES (2 - Td or Tdap) 2026    Pneumococcal Vaccine 65+  Completed    ZOSTER VACCINE  Addressed       Orders Placed This Encounter   Procedures    [facility] Bilateral With CAD     [facility] [place]     Standing Status:   Future     Standing Expiration Date:   4/3/2025     Order Specific Question:   Reason for Exam:     Answer:   need for mammogram, history of breast cancer     Order Specific Question:   Release to patient     Answer:   Routine Release [[id number]]    Microalbumin / Creatinine Urine Ratio - Urine, Clean Catch     Order Specific Question:   Release to patient     Answer:   Routine Release [[id number]]    CBC (No Diff)     Order Specific Question:   Release to patient     Answer:   Routine Release  [[id number]]    Comprehensive Metabolic Panel     Order Specific Question:   Release to patient     Answer:   Routine Release [[id number]]    Ferritin     Order Specific Question:   Release to patient     Answer:   Routine Release [[id number]]    Hemoglobin A1c     Order Specific Question:   Release to patient     Answer:   Routine Release [[id number]]    Lipid Panel With LDL/HDL Ratio     Order Specific Question:   Release to patient     Answer:   Routine Release [[id number]]       Return in about 6 months (around 10/3/2024) for Recheck.

## 2024-04-03 NOTE — PROGRESS NOTES
"The ABCs of the Annual Wellness Visit  Subsequent Medicare Wellness Visit    Subjective    Jessica Davies is a 83 y.o. female who presents for a Subsequent Medicare Wellness Visit.    The following portions of the patient's history were reviewed and   updated as appropriate: {history reviewed:20406::\"allergies\",\"current medications\",\"past family history\",\"past medical history\",\"past social history\",\"past surgical history\",\"problem list\"}.    Compared to one year ago, the patient feels her physical   health is {better worse same:19327}.    Compared to one year ago, the patient feels her mental   health is {better worse same:07737}.    Recent Hospitalizations:  {Hospital Admission Status in the last 365 days:61105}      Current Medical Providers:  Patient Care Team:  Mike Worrell MD as PCP - General (Family Medicine)  Mele Chapa MD as Consulting Physician (Cardiology)  Cash Hood MD as Consulting Physician (Gastroenterology)    Outpatient Medications Prior to Visit   Medication Sig Dispense Refill    acetaminophen (TYLENOL) 500 MG tablet Take 1 tablet by mouth Every 6 (Six) Hours As Needed for Mild Pain.      apixaban (Eliquis) 5 MG tablet tablet TAKE ONE TABLET BY MOUTH EVERY TWELVE HOURS 180 tablet 1    B Complex Vitamins (VITAMIN B COMPLEX PO) Take  by mouth.      Calcium-Magnesium-Vitamin D (CALCIUM MAGNESIUM PO) Take  by mouth.      clonazePAM (KlonoPIN) 1 MG tablet Take 1 tablet by mouth 2 (Two) Times a Day As Needed for Anxiety. 60 tablet 0    DIGESTIVE ENZYMES PO Take  by mouth Daily.      estradiol (ESTRACE) 0.1 MG/GM vaginal cream USE A ONE INCH STRIPE ON VULVA AND AT INTROITUS OF VAGINA AT BEDTIME FOR TWO WEEKS THEN TWICE WEEKLY      famotidine (PEPCID) 40 MG tablet Take 1 tablet by mouth Every Night.      furosemide (LASIX) 20 MG tablet Take 1 tablet by mouth Daily. 30 tablet 6    Licorice, Glycyrrhiza glabra, (LICORICE PO) Take  by mouth Daily.      nadolol (CORGARD) 80 MG tablet " "TAKE 1 TABLET BY MOUTH DAILY 90 tablet 1    Probiotic Product (PROBIOTIC ADVANCED PO) Take  by mouth Daily.      sucralfate (CARAFATE) 1 g tablet Take 1 tablet by mouth 2 (Two) Times a Day. 60 tablet 1     No facility-administered medications prior to visit.       No opioid medication identified on active medication list. I have reviewed chart for other potential  high risk medication/s and harmful drug interactions in the elderly.        Aspirin is not on active medication list.  {ASPIRIN NOT ON MEDICATION LIST INDICATED/NOT INDICATED:37838}.    Patient Active Problem List   Diagnosis    Cancer    Malignant neoplasm of breast    Spinal stenosis of cervical region    Congenital absence of fingers or toes    Osteopenia    Diabetes mellitus type 2, controlled, without complications    Herpes    Elevated cholesterol    Osteoarthritis of lumbar spine    Kyphoscoliosis and scoliosis    Hemispheric carotid artery syndrome    Restless legs syndrome (RLS)    Abnormal MRI, thoracic spine    Dyspnea on exertion    Multifocal atrial tachycardia    Paroxysmal atrial flutter    Anxiety    Iron deficiency anemia    Permanent atrial fibrillation    Gastroesophageal reflux disease    Dysphagia    Early satiety    Anticoagulated    Arthritis    Basal cell carcinoma of skin    DDD (degenerative disc disease), cervical    Multinodular goiter    Nonrheumatic aortic valve insufficiency    Personal history of breast cancer    Adactyly    Type 2 diabetes mellitus without complication    Iron deficiency anemia    Lumbar spondylosis    Restless legs syndrome (RLS)    Nonrheumatic mitral valve regurgitation    Rectal pain     Advance Care Planning   Advance Care Planning     Advance Directive is not on file.  {ACP Discussion, Advance Directive not in EMR:96557}     Objective    Vitals:    04/03/24 1318   Resp: 18   Weight: 66.7 kg (147 lb)   Height: 160 cm (63\")     Estimated body mass index is 26.04 kg/m² as calculated from the following:   " " Height as of this encounter: 160 cm (63\").    Weight as of this encounter: 66.7 kg (147 lb).    {BMI is >= 25 and <30. (Overweight) The following options were offered after discussion;:8907940010}      Does the patient have evidence of cognitive impairment? {Yes/No:39312}          HEALTH RISK ASSESSMENT    Smoking Status:  Social History     Tobacco Use   Smoking Status Former    Current packs/day: 0.00    Average packs/day: 3.0 packs/day for 10.0 years (30.0 ttl pk-yrs)    Types: Cigarettes    Start date:     Quit date:     Years since quittin.2   Smokeless Tobacco Never   Tobacco Comments    quit 40 yrs ago     Alcohol Consumption:  Social History     Substance and Sexual Activity   Alcohol Use Not Currently    Alcohol/week: 1.0 standard drink of alcohol    Types: 1 Glasses of wine per week     Fall Risk Screen:    ISAAK Fall Risk Assessment was completed, and patient is at LOW risk for falls.Assessment completed on:4/3/2024    Depression Screenin/3/2024     1:21 PM   PHQ-2/PHQ-9 Depression Screening   Little Interest or Pleasure in Doing Things 0-->not at all   Feeling Down, Depressed or Hopeless 0-->not at all   PHQ-9: Brief Depression Severity Measure Score 0       Health Habits and Functional and Cognitive Screenin/3/2024     1:20 PM   Functional & Cognitive Status   Do you have difficulty preparing food and eating? No   Do you have difficulty bathing yourself, getting dressed or grooming yourself? No   Do you have difficulty using the toilet? No   Do you have difficulty moving around from place to place? No   Do you have trouble with steps or getting out of a bed or a chair? Yes   Current Diet Well Balanced Diet   Dental Exam Up to date   Eye Exam Up to date   Exercise (times per week) 3 times per week   Current Exercises Include Walking;Stationary Bicycling/Spin Class   Do you need help using the phone?  No   Are you deaf or do you have serious difficulty hearing?  Yes   Do " you need help to go to places out of walking distance? No   Do you need help shopping? No   Do you need help preparing meals?  No   Do you need help with housework?  No   Do you need help with laundry? No   Do you need help taking your medications? No   Do you need help managing money? No   Do you ever drive or ride in a car without wearing a seat belt? No   Have you felt unusual stress, anger or loneliness in the last month? No   Who do you live with? Spouse   If you need help, do you have trouble finding someone available to you? No   Have you been bothered in the last four weeks by sexual problems? No   Do you have difficulty concentrating, remembering or making decisions? No       Age-appropriate Screening Schedule:  Refer to the list below for future screening recommendations based on patient's age, sex and/or medical conditions. Orders for these recommended tests are listed in the plan section. The patient has been provided with a written plan.    Health Maintenance   Topic Date Due    RSV Vaccine - Adults (1 - 1-dose 60+ series) Never done    BMI FOLLOWUP  07/10/2020    COLORECTAL CANCER SCREENING  10/01/2022    URINE MICROALBUMIN  07/06/2023    DXA SCAN  07/08/2023    COVID-19 Vaccine (6 - 2023-24 season) 09/01/2023    HEMOGLOBIN A1C  02/24/2024    INFLUENZA VACCINE  08/01/2024    LIPID PANEL  08/24/2024    DIABETIC EYE EXAM  12/05/2024    ANNUAL WELLNESS VISIT  04/03/2025    TDAP/TD VACCINES (2 - Td or Tdap) 12/22/2026    Pneumococcal Vaccine 65+  Completed    ZOSTER VACCINE  Addressed                  CMS Preventative Services Quick Reference  Risk Factors Identified During Encounter  {Medicare Wellness Risk Factors:83149}  The above risks/problems have been discussed with the patient.  Pertinent information has been shared with the patient in the After Visit Summary.  An After Visit Summary and PPPS were made available to the patient.    Follow Up:   Next Medicare Wellness visit to be scheduled in 1 year.  "  {Wrapup  Review (Popup)  Advance Care Planning  Labs  CC  Problem List  Visit Diagnosis  Medications  Result Review  Imaging  Health Maintenance  Quality  BestPractice  SmartSets  SnapShot  Encounters  Notes  Media  Procedures :23}    Additional E&M Note during same encounter follows:  Patient has multiple medical problems which are significant and separately identifiable that require additional work above and beyond the Medicare Wellness Visit.      Chief Complaint  Annual Exam    Subjective    {Problem List  Visit Diagnosis   Encounters  Notes  Medications  Labs  Result Review Imaging  Media :23}    HPI  Jessica Davies is also being seen today for ***         Objective   Vital Signs:  Resp 18   Ht 160 cm (63\")   Wt 66.7 kg (147 lb)   BMI 26.04 kg/m²     Physical Exam     {The following data was reviewed by (Optional):89041}  {Ambulatory Labs (Optional):61802}  {Data reviewed (Optional):72527:::1}           Assessment and Plan {CC Problem List  Visit Diagnosis   ROS  Review (Popup)  Health Maintenance  Quality  BestPractice  Medications  SmartSets  SnapShot Encounters  Media :23}  There are no diagnoses linked to this encounter.       {Time Spent (Optional):40363}  Follow Up {Instructions Charge Capture  Follow-up Communications :23}  No follow-ups on file.  Patient was given instructions and counseling regarding her condition or for health maintenance advice. Please see specific information pulled into the AVS if appropriate.           "

## 2024-04-03 NOTE — PROGRESS NOTES
Medicare Subsequent Wellness Visit  Subjective   Jessica Davies is a 83 y.o. female who presents for an Medicare Wellness Visit.   Patient Care Team:  Mike Worrell MD as PCP - General (Family Medicine)  Mele Chapa MD as Consulting Physician (Cardiology)  Cash Hood MD as Consulting Physician (Gastroenterology)    Recent Hospitalizations:  none    Age-appropriate Screening Schedule:  Refer to the list below for future screening recommendations based on patient's age. The patient has been provided with a written plan.    Health Maintenance   Topic Date Due    RSV Vaccine - Adults (1 - 1-dose 60+ series) Never done    BMI FOLLOWUP  07/10/2020    COLORECTAL CANCER SCREENING  10/01/2022    URINE MICROALBUMIN  07/06/2023    DXA SCAN  07/08/2023    COVID-19 Vaccine (6 - 2023-24 season) 09/01/2023    HEMOGLOBIN A1C  02/24/2024    INFLUENZA VACCINE  08/01/2024    LIPID PANEL  08/24/2024    DIABETIC EYE EXAM  12/05/2024    ANNUAL WELLNESS VISIT  04/03/2025    TDAP/TD VACCINES (2 - Td or Tdap) 12/22/2026    Pneumococcal Vaccine 65+  Completed    ZOSTER VACCINE  Addressed     Outpatient Medications Prior to Visit   Medication Sig Dispense Refill    acetaminophen (TYLENOL) 500 MG tablet Take 1 tablet by mouth Every 6 (Six) Hours As Needed for Mild Pain.      apixaban (Eliquis) 5 MG tablet tablet TAKE ONE TABLET BY MOUTH EVERY TWELVE HOURS 180 tablet 1    B Complex Vitamins (VITAMIN B COMPLEX PO) Take  by mouth.      Calcium-Magnesium-Vitamin D (CALCIUM MAGNESIUM PO) Take  by mouth.      clonazePAM (KlonoPIN) 1 MG tablet Take 1 tablet by mouth 2 (Two) Times a Day As Needed for Anxiety. 60 tablet 0    DIGESTIVE ENZYMES PO Take  by mouth Daily.      estradiol (ESTRACE) 0.1 MG/GM vaginal cream USE A ONE INCH STRIPE ON VULVA AND AT INTROITUS OF VAGINA AT BEDTIME FOR TWO WEEKS THEN TWICE WEEKLY      famotidine (PEPCID) 40 MG tablet Take 1 tablet by mouth Every Night.      furosemide (LASIX) 20 MG tablet Take 1  tablet by mouth Daily. 30 tablet 6    Licorice, Glycyrrhiza glabra, (LICORICE PO) Take  by mouth Daily.      nadolol (CORGARD) 80 MG tablet TAKE 1 TABLET BY MOUTH DAILY 90 tablet 1    Probiotic Product (PROBIOTIC ADVANCED PO) Take  by mouth Daily.      sucralfate (CARAFATE) 1 g tablet Take 1 tablet by mouth 2 (Two) Times a Day. 60 tablet 1     No facility-administered medications prior to visit.      Patient Active Problem List   Diagnosis    Cancer    Malignant neoplasm of breast    Spinal stenosis of cervical region    Congenital absence of fingers or toes    Osteopenia    Diabetes mellitus type 2, controlled, without complications    Herpes    Elevated cholesterol    Osteoarthritis of lumbar spine    Kyphoscoliosis and scoliosis    Hemispheric carotid artery syndrome    Restless legs syndrome (RLS)    Abnormal MRI, thoracic spine    Dyspnea on exertion    Multifocal atrial tachycardia    Paroxysmal atrial flutter    Anxiety    Iron deficiency anemia    Permanent atrial fibrillation    Gastroesophageal reflux disease    Dysphagia    Early satiety    Anticoagulated    Arthritis    Basal cell carcinoma of skin    DDD (degenerative disc disease), cervical    Multinodular goiter    Nonrheumatic aortic valve insufficiency    Personal history of breast cancer    Adactyly    Type 2 diabetes mellitus without complication    Iron deficiency anemia    Lumbar spondylosis    Restless legs syndrome (RLS)    Nonrheumatic mitral valve regurgitation    Rectal pain    Autoimmune thyroiditis    Chronic low back pain    Arthropathy of lumbar facet joint    Lumbar spondylosis     Depression Screen:       4/3/2024     1:21 PM   PHQ-2/PHQ-9 Depression Screening   Little Interest or Pleasure in Doing Things 0-->not at all   Feeling Down, Depressed or Hopeless 0-->not at all   PHQ-9: Brief Depression Severity Measure Score 0       Functional and Cognitive Screenin/3/2024     1:20 PM   Functional & Cognitive Status   Do you have  "difficulty preparing food and eating? No   Do you have difficulty bathing yourself, getting dressed or grooming yourself? No   Do you have difficulty using the toilet? No   Do you have difficulty moving around from place to place? No   Do you have trouble with steps or getting out of a bed or a chair? Yes   Current Diet Well Balanced Diet   Dental Exam Up to date   Eye Exam Up to date   Exercise (times per week) 3 times per week   Current Exercises Include Walking;Stationary Bicycling/Spin Class   Do you need help using the phone?  No   Are you deaf or do you have serious difficulty hearing?  Yes   Do you need help to go to places out of walking distance? No   Do you need help shopping? No   Do you need help preparing meals?  No   Do you need help with housework?  No   Do you need help with laundry? No   Do you need help taking your medications? No   Do you need help managing money? No   Do you ever drive or ride in a car without wearing a seat belt? No   Have you felt unusual stress, anger or loneliness in the last month? No   Who do you live with? Spouse   If you need help, do you have trouble finding someone available to you? No   Have you been bothered in the last four weeks by sexual problems? No   Do you have difficulty concentrating, remembering or making decisions? No     Does the patient have evidence of cognitive impairment? none    Compared to one year ago, the patient feels their physical health and mental health are the same.     BMI is >= 25 and <30. (Overweight) The following options were offered after discussion;: weight loss educational material (shared in after visit summary)       Objective     Vitals:    04/03/24 1318   BP: 134/88   Pulse: 50   Resp: 18   SpO2: 98%   Weight: 66.7 kg (147 lb)   Height: 160 cm (63\")     Body mass index is 26.04 kg/m².    Follow Up:  Medicare Well visit in one year    ADVANCED DIRECTIVES:   ACP discussion was held with the patient during this visit. Patient has an " advance directive (not in EMR), copy requested.    An After Visit Summary and PPPS with all of these plans were given to the patient.     Additional E&M Note during same encounter follows:  Patient has multiple medical problems which are significant and separately identifiable that require additional work above and beyond the Medicare Wellness Visit.      Subjective   Jessica Davies is a 83 y.o. female who also presents for Annual Exam, Diabetes, and Hyperlipidemia   HPI   Type 2 diabetes mellitus with unspecified complications (HCC)  -     CBC & Differential  -     Comprehensive metabolic panel  -     Ferritin  -     Hemoglobin A1c  -     Microalbumin / Creatinine Urine Ratio - Urine, Clean Catch     Elevated cholesterol  -     Lipid panel      Iron deficiency anemia, unspecified iron deficiency anemia type  -     Ferritin      Permanent atrial fibrillation  -     CBC & Differential  -     Comprehensive metabolic panel    Review of Systems    PHYSICAL EXAM  Vitals reviewed   HEENT: PERRLA, EOMI. Oral mucosa moist,   No LAD.  CV: RRR, no murmurs, rubs, clicks or gallops  LUNGS: CTA bilaterally  EXT: No edema, FROM in bilateral upper and lower ext  NEURO: CN II - XII grossly intact  PSYCH: good mood, positive affect, alert and engaged. No thoughts of self harm  expressed.  Assessment & Plan   Problem List Items Addressed This Visit          Cardiac and Vasculature    Elevated cholesterol    Relevant Orders    Lipid Panel With LDL/HDL Ratio    Permanent atrial fibrillation    Overview     She has chronic and permanent atrial fibrillation.  She is followed by cardiology and managing well on current medication.  She is taking Eliquis and is rate controlled.            Endocrine and Metabolic    Type 2 diabetes mellitus without complication    Overview     Formatting of this note might be different from the original.  Description: (PL)         Relevant Orders    Microalbumin / Creatinine Urine Ratio - Urine, Clean  Catch    Hemoglobin A1c       Hematology and Neoplasia    Iron deficiency anemia    Relevant Orders    Ferritin     Other Visit Diagnoses       Medicare annual wellness visit, subsequent    -  Primary    Relevant Orders    CBC (No Diff)    Comprehensive Metabolic Panel    DM type 2, goal HbA1c 7%-8%        Encounter for screening mammogram for malignant neoplasm of breast        Relevant Orders    Mammo Screening Bilateral With CAD        I have also encouraged her to get the vaccinations that she needs and she expressed understanding.       Orders Placed This Encounter   Procedures    Mammo Screening Bilateral With CAD    Microalbumin / Creatinine Urine Ratio - Urine, Clean Catch    CBC (No Diff)    Comprehensive Metabolic Panel    Ferritin    Hemoglobin A1c    Lipid Panel With LDL/HDL Ratio        Return in about 6 months (around 10/3/2024) for Recheck.

## 2024-04-04 LAB
ALBUMIN SERPL-MCNC: 4.5 G/DL (ref 3.7–4.7)
ALBUMIN/CREAT UR: ABNORMAL MG/G CREAT (ref 0–29)
ALBUMIN/GLOB SERPL: 1.6 {RATIO} (ref 1.2–2.2)
ALP SERPL-CCNC: 106 IU/L (ref 44–121)
ALT SERPL-CCNC: 15 IU/L (ref 0–32)
AST SERPL-CCNC: 25 IU/L (ref 0–40)
BILIRUB SERPL-MCNC: 0.5 MG/DL (ref 0–1.2)
BUN SERPL-MCNC: 11 MG/DL (ref 8–27)
BUN/CREAT SERPL: 17 (ref 12–28)
CALCIUM SERPL-MCNC: 9.4 MG/DL (ref 8.7–10.3)
CHLORIDE SERPL-SCNC: 101 MMOL/L (ref 96–106)
CHOLEST SERPL-MCNC: 239 MG/DL (ref 100–199)
CO2 SERPL-SCNC: 24 MMOL/L (ref 20–29)
CREAT SERPL-MCNC: 0.63 MG/DL (ref 0.57–1)
CREAT UR-MCNC: 8.6 MG/DL
EGFRCR SERPLBLD CKD-EPI 2021: 88 ML/MIN/1.73
ERYTHROCYTE [DISTWIDTH] IN BLOOD BY AUTOMATED COUNT: 18.1 % (ref 11.7–15.4)
FERRITIN SERPL-MCNC: 33 NG/ML (ref 15–150)
GLOBULIN SER CALC-MCNC: 2.9 G/DL (ref 1.5–4.5)
GLUCOSE SERPL-MCNC: 113 MG/DL (ref 70–99)
HBA1C MFR BLD: 7 % (ref 4.8–5.6)
HCT VFR BLD AUTO: 44.6 % (ref 34–46.6)
HDLC SERPL-MCNC: 40 MG/DL
HGB BLD-MCNC: 14.1 G/DL (ref 11.1–15.9)
LDLC SERPL CALC-MCNC: 161 MG/DL (ref 0–99)
LDLC/HDLC SERPL: 4 RATIO (ref 0–3.2)
MCH RBC QN AUTO: 25.1 PG (ref 26.6–33)
MCHC RBC AUTO-ENTMCNC: 31.6 G/DL (ref 31.5–35.7)
MCV RBC AUTO: 80 FL (ref 79–97)
MICROALBUMIN UR-MCNC: <3 UG/ML
PLATELET # BLD AUTO: 256 X10E3/UL (ref 150–450)
POTASSIUM SERPL-SCNC: 4 MMOL/L (ref 3.5–5.2)
PROT SERPL-MCNC: 7.4 G/DL (ref 6–8.5)
RBC # BLD AUTO: 5.61 X10E6/UL (ref 3.77–5.28)
SODIUM SERPL-SCNC: 142 MMOL/L (ref 134–144)
TRIGL SERPL-MCNC: 206 MG/DL (ref 0–149)
VLDLC SERPL CALC-MCNC: 38 MG/DL (ref 5–40)
WBC # BLD AUTO: 8.1 X10E3/UL (ref 3.4–10.8)

## 2024-04-05 DIAGNOSIS — E11.9 TYPE 2 DIABETES MELLITUS WITHOUT COMPLICATION, WITHOUT LONG-TERM CURRENT USE OF INSULIN: Primary | ICD-10-CM

## 2024-04-05 RX ORDER — NADOLOL 80 MG/1
80 TABLET ORAL DAILY
Qty: 90 TABLET | Refills: 0 | Status: SHIPPED | OUTPATIENT
Start: 2024-04-05

## 2024-04-10 LAB
ALBUMIN/CREAT UR: <21 MG/G CREAT (ref 0–29)
CREAT UR-MCNC: 14.5 MG/DL
MICROALBUMIN UR-MCNC: <3 UG/ML

## 2024-06-18 RX ORDER — APIXABAN 5 MG/1
TABLET, FILM COATED ORAL
Qty: 180 TABLET | Refills: 0 | Status: SHIPPED | OUTPATIENT
Start: 2024-06-18

## 2024-06-24 ENCOUNTER — TELEPHONE (OUTPATIENT)
Dept: FAMILY MEDICINE CLINIC | Facility: CLINIC | Age: 84
End: 2024-06-24

## 2024-06-24 NOTE — TELEPHONE ENCOUNTER
Caller: Jessica Davies    Relationship: Self    Best call back number: [phone]    What orders are you requesting (i.e. lab or imaging): A1C RE-CHECK    In what timeframe would the patient need to come in: ASAP    Where will you receive your lab/imaging services: OFFICE     Additional notes: PATIENT CALLED AND ASKED TO HAVE HER A1C LAB DRAWN AGAIN

## 2024-07-01 ENCOUNTER — TELEPHONE (OUTPATIENT)
Dept: FAMILY MEDICINE CLINIC | Facility: CLINIC | Age: 84
End: 2024-07-01

## 2024-07-01 NOTE — TELEPHONE ENCOUNTER
Caller: Jessica Davies    Relationship: Self    Best call back number: [phone]     What is the best time to reach you: ANY    Who are you requesting to speak with (clinical staff, provider,  specific staff member): LAB    Do you know the name of the person who called: JESSICA    What was the call regarding: PATIENT WOULD LIKE TO COME IN TODAY FOR A1C. CAN NOT COME IN TOMORROW

## 2024-07-02 LAB
ALBUMIN/CREAT UR: 26 MG/G CREAT (ref 0–29)
CREAT UR-MCNC: 18.3 MG/DL
MICROALBUMIN UR-MCNC: 4.7 UG/ML

## 2024-07-03 RX ORDER — NADOLOL 80 MG/1
80 TABLET ORAL DAILY
Qty: 90 TABLET | Refills: 1 | Status: SHIPPED | OUTPATIENT
Start: 2024-07-03

## 2024-07-19 ENCOUNTER — OFFICE VISIT (OUTPATIENT)
Age: 84
End: 2024-07-19
Payer: MEDICARE

## 2024-07-19 DIAGNOSIS — I48.21 PERMANENT ATRIAL FIBRILLATION: ICD-10-CM

## 2024-07-19 DIAGNOSIS — R06.09 DYSPNEA ON EXERTION: ICD-10-CM

## 2024-07-19 DIAGNOSIS — I34.0 NONRHEUMATIC MITRAL VALVE REGURGITATION: Primary | ICD-10-CM

## 2024-07-22 PROBLEM — I48.92 PAROXYSMAL ATRIAL FLUTTER: Status: RESOLVED | Noted: 2017-09-11 | Resolved: 2024-07-22

## 2024-07-22 RX ORDER — TORSEMIDE 20 MG/1
20 TABLET ORAL DAILY
Qty: 30 TABLET | Refills: 1 | Status: SHIPPED | OUTPATIENT
Start: 2024-07-22

## 2024-07-22 NOTE — PROGRESS NOTES
"      ELECTROPHYSIOLOGY   Date of Office Visit: 2024  Patient Name: Jessica Davies  : 1940  Encounter Provider: Robles Espinoza PA-C    Electrophysiologist: Dr. Monteiro  CHIEF COMPLAINT / REASON FOR OFFICE VISIT     Perm AF   6 month follow up     HISTORY OF PRESENT ILLNESS     This is a 83 y.o. year old female who presents to [facility] CARDIOLOGY for a for a 6 month follow up.     She has a longstanding history of permanent atrial fibrillation.  She takes apixaban 5 mg twice daily for anticoagulation.  Denies bleeding complications.    Last follow-up she reported increasing dyspnea on exertion since 2023.  This is most noticeable when she is walking.    She does have chronic back pain that will limit her overall exercise capacity.  She has noted that she has gained some weight since her last visit.  We did talk today that from a cardiology standpoint she would be cleared to start a GLP 1 agonist and needs to Dr. CRUZ about this.    In office today she did report some worsening dyspnea.  This has been ongoing for the last 6 months and really has not improved.  She talked about some episodes of what is described to be orthopnea keeping her up at night.  She explains the chest tightness and pressure in her chest.  On examination today she was hypervolemic and noted to have crackles in the bilateral bases of her lungs.    She noted that her Lasix is not working like it used to.    She denied any chest pain, palpitations, dizziness lightheadedness or near syncope.    PMHx: Permanent atrial fibrillation, moderate mitral valve regurgitation, breast cancer, iron deficiency anemia, basal cell carcinoma of the skin, anxiety, restless leg syndrome, chronic back pain    PHYSICAL EXAMINATION     Vital Signs:  There were no vitals taken for this visit.  Estimated body mass index is 26.04 kg/m² as calculated from the following:    Height as of 4/3/24: 160 cm (63\").    Weight as of " 4/3/24: 66.7 kg (147 lb).               Physical Exam  Constitutional:       Appearance: Normal appearance.   HENT:      Head: Normocephalic and atraumatic.   Cardiovascular:      Rate and Rhythm: Normal rate. Rhythm irregular.      Pulses: Normal pulses.      Heart sounds: Normal heart sounds.   Pulmonary:      Effort: Pulmonary effort is normal.      Breath sounds: Normal breath sounds.   Musculoskeletal:      Right lower leg: No edema.      Left lower leg: No edema.   Skin:     General: Skin is warm and dry.   Neurological:      General: No focal deficit present.      Mental Status: She is alert and oriented to person, place, and time.          Cardiac Testing/Results     Cardiac Testing:   - Echo 6/2022: EF of 60% with normal LV size and function.  Moderate MR.  Mild pulmonary hypertension with RVSP of 46 mmHg.  Moderate TR.      Result Review :  The following data was reviewed by: Robles Espinoza PA-C on 07/19/2024:    Lipid Panel          4/3/2024    14:11   Lipid Panel   Total Cholesterol 239    Triglycerides 206    HDL Cholesterol 40    VLDL Cholesterol 38    LDL Cholesterol  161    LDL/HDL Ratio 4.0       Lab Results   Component Value Date     04/03/2024     02/02/2024    K 4.0 04/03/2024    K 4.6 02/02/2024     04/03/2024     02/02/2024    CO2 24 04/03/2024    CO2 27.2 02/02/2024    BUN 11 04/03/2024    BUN 11 02/02/2024    CREATININE 0.63 04/03/2024    CREATININE 0.71 02/02/2024    EGFRIFNONA 116 05/11/2021    EGFRIFNONA 96 01/22/2021    EGFRIFAFRI 117 01/22/2021    EGFRIFAFRI 122 07/08/2019    GLUCOSE 113 (H) 04/03/2024    GLUCOSE 125 (H) 02/02/2024    CALCIUM 9.4 04/03/2024    CALCIUM 9.3 02/02/2024    ALBUMIN 4.5 04/03/2024    ALBUMIN 4.2 07/06/2022    AST 25 04/03/2024    AST 19 07/06/2022    ALT 15 04/03/2024    ALT 16 07/06/2022     Lab Results   Component Value Date    WBC 8.1 04/03/2024    WBC 10.6 07/06/2022    HGB 14.1 04/03/2024    HGB 13.6 07/06/2022    HCT 44.6  "04/03/2024    HCT 43.6 07/06/2022    MCV 80 04/03/2024    MCV 82 07/06/2022     04/03/2024     07/06/2022     No results found for: \"PROBNP\", \"BNP\"  Lab Results   Component Value Date    CKTOTAL 30 06/13/2018     Lab Results   Component Value Date    TSH 2.15 04/01/2024                 ECG 12 Lead    Date/Time: 7/22/2024 12:15 PM  Performed by: Robles Pettit PA-C    Authorized by: Robles Pettit PA-C  Comparison: compared with previous ECG from 1/19/2024  Rhythm: atrial fibrillation  Rate: normal  BPM: 77  T Waves: T waves normal  QRS axis: normal  Comments: Qc 433                ASSESSMENT & PLAN       Diagnoses and all orders for this visit:    1. Nonrheumatic mitral valve regurgitation (Primary)  -     Adult Transthoracic Echo Complete W/ Cont if Necessary Per Protocol; Future  Today she is explaining that she is having worsening dyspnea on exertion that seems to be more prevalent the last 6 to 8 months.  Symptoms are most present when walking upstairs.  She is not able to exercise as much as she was  Previous echo showed moderate mitral valve regurgitation and she does have some symptoms of volume overload on examination today with crackles in her bilateral lungs.  Will plan to check an echocardiogram to evaluate for valvular heart disease progression  2. Dyspnea on exertion  Will check echo to rule out any change in diastolic/systolic function.  Previous echo also may be showed moderate to mild pulmonary hypertension and we will check this on repeat echo  Plan to increase her Lasix to 40 mg for the next 3 days and then start torsemide 20 mg daily instead.  Since sixth May be developing some resistance to her Lasix as it is not working like it used to in the past per her report.  3. Permanent atrial fibrillation  Rates have been well-controlled.  Denies any bleeding complications with apixaban.  Continue apixaban 5 mg twice daily and nadolol 80 mg daily for rate " control  \        Follow Up:  Return in about 3 months (around 10/19/2024) for Dr. Chandni Calderon.  Patient was given instructions and counseling regarding her condition or for health maintenance advice. Please contact office if worsening symptoms or proceed to ER when appropriate.      Robles Espinoza PA-C  07/22/24  12:15 EDT    MEDICATIONS         Discharge Medications            Accurate as of July 19, 2024 11:59 PM. If you have any questions, ask your nurse or doctor.                New Medications        Instructions Start Date   torsemide 20 MG tablet  Commonly known as: DEMADEX  Started by: Robles Espinoza PA-C   20 mg, Oral, Daily             Continue These Medications        Instructions Start Date   acetaminophen 500 MG tablet  Commonly known as: TYLENOL   500 mg, Oral, Every 6 Hours PRN      CALCIUM MAGNESIUM PO   Oral      clonazePAM 1 MG tablet  Commonly known as: KlonoPIN   1 mg, Oral, 2 Times Daily PRN      DIGESTIVE ENZYMES PO   Oral, Daily      Eliquis 5 MG tablet tablet  Generic drug: apixaban   TAKE ONE TABLET BY MOUTH EVERY TWELVE HOURS      estradiol 0.1 MG/GM vaginal cream  Commonly known as: ESTRACE   USE A ONE INCH STRIPE ON VULVA AND AT INTROITUS OF VAGINA AT BEDTIME FOR TWO WEEKS THEN TWICE WEEKLY      famotidine 40 MG tablet  Commonly known as: PEPCID   40 mg, Oral, Nightly      LICORICE PO   Oral, Daily      nadolol 80 MG tablet  Commonly known as: CORGARD   80 mg, Oral, Daily      PROBIOTIC ADVANCED PO   Oral, Daily      VITAMIN B COMPLEX PO   Oral                   **Dragon Disclaimer: This note was dictated using an electronic transcription. The electronic translation of spoken language may permit erroneous, or at times, nonsensical words or phrases to be inadvertently transcribed. Although I have reviewed the note for such errors, some may still exist.

## 2024-07-30 ENCOUNTER — TELEPHONE (OUTPATIENT)
Dept: CARDIOLOGY | Facility: CLINIC | Age: 84
End: 2024-07-30
Payer: MEDICARE

## 2024-07-31 ENCOUNTER — HOSPITAL ENCOUNTER (OUTPATIENT)
Dept: CARDIOLOGY | Facility: HOSPITAL | Age: 84
Discharge: HOME | End: 2024-07-31
Admitting: PHYSICIAN ASSISTANT
Payer: MEDICARE

## 2024-07-31 VITALS — HEIGHT: 63 IN | BODY MASS INDEX: 26.05 KG/M2 | WEIGHT: 147 LBS

## 2024-07-31 DIAGNOSIS — I34.0 NONRHEUMATIC MITRAL VALVE REGURGITATION: ICD-10-CM

## 2024-07-31 DIAGNOSIS — R06.09 DYSPNEA ON EXERTION: ICD-10-CM

## 2024-07-31 LAB
AORTIC ARCH: 3.1 CM
AORTIC DIMENSIONLESS INDEX: 0.8 (DI)
ASCENDING AORTA: 2.7 CM
BH CV ECHO MEAS - ACS: 1.68 CM
BH CV ECHO MEAS - AO MAX PG: 4.4 MMHG
BH CV ECHO MEAS - AO MEAN PG: 2 MMHG
BH CV ECHO MEAS - AO ROOT DIAM: 3.1 CM
BH CV ECHO MEAS - AO V2 MAX: 105 CM/SEC
BH CV ECHO MEAS - AO V2 VTI: 20.3 CM
BH CV ECHO MEAS - AVA(I,D): 2.37 CM2
BH CV ECHO MEAS - EDV(CUBED): 59.3 ML
BH CV ECHO MEAS - EDV(MOD-SP2): 47 ML
BH CV ECHO MEAS - EDV(MOD-SP4): 41 ML
BH CV ECHO MEAS - EF(MOD-BP): 59.5 %
BH CV ECHO MEAS - EF(MOD-SP2): 59.6 %
BH CV ECHO MEAS - EF(MOD-SP4): 61 %
BH CV ECHO MEAS - ESV(CUBED): 19.9 ML
BH CV ECHO MEAS - ESV(MOD-SP2): 19 ML
BH CV ECHO MEAS - ESV(MOD-SP4): 16 ML
BH CV ECHO MEAS - FS: 30.5 %
BH CV ECHO MEAS - IVS/LVPW: 0.9 CM
BH CV ECHO MEAS - IVSD: 0.9 CM
BH CV ECHO MEAS - LAT PEAK E' VEL: 8.5 CM/SEC
BH CV ECHO MEAS - LV DIASTOLIC VOL/BSA (35-75): 24.2 CM2
BH CV ECHO MEAS - LV MASS(C)D: 113.6 GRAMS
BH CV ECHO MEAS - LV MAX PG: 2.27 MMHG
BH CV ECHO MEAS - LV MEAN PG: 1 MMHG
BH CV ECHO MEAS - LV SYSTOLIC VOL/BSA (12-30): 9.4 CM2
BH CV ECHO MEAS - LV V1 MAX: 75.3 CM/SEC
BH CV ECHO MEAS - LV V1 VTI: 15.7 CM
BH CV ECHO MEAS - LVIDD: 3.9 CM
BH CV ECHO MEAS - LVIDS: 2.7 CM
BH CV ECHO MEAS - LVOT AREA: 3.1 CM2
BH CV ECHO MEAS - LVOT DIAM: 1.97 CM
BH CV ECHO MEAS - LVPWD: 1 CM
BH CV ECHO MEAS - MED PEAK E' VEL: 6.3 CM/SEC
BH CV ECHO MEAS - MR MAX PG: 69.6 MMHG
BH CV ECHO MEAS - MR MAX VEL: 417 CM/SEC
BH CV ECHO MEAS - MV DEC SLOPE: 680.8 CM/SEC2
BH CV ECHO MEAS - MV DEC TIME: 0.13 SEC
BH CV ECHO MEAS - MV E MAX VEL: 117 CM/SEC
BH CV ECHO MEAS - MV MAX PG: 5.8 MMHG
BH CV ECHO MEAS - MV MEAN PG: 2.7 MMHG
BH CV ECHO MEAS - MV P1/2T: 56.1 MSEC
BH CV ECHO MEAS - MV V2 VTI: 26.4 CM
BH CV ECHO MEAS - MVA(P1/2T): 3.9 CM2
BH CV ECHO MEAS - MVA(VTI): 1.82 CM2
BH CV ECHO MEAS - PA ACC TIME: 0.08 SEC
BH CV ECHO MEAS - PA V2 MAX: 70.6 CM/SEC
BH CV ECHO MEAS - PI END-D VEL: 127.6 CM/SEC
BH CV ECHO MEAS - QP/QS: 1.1
BH CV ECHO MEAS - RAP SYSTOLE: 8 MMHG
BH CV ECHO MEAS - RV MAX PG: 1.38 MMHG
BH CV ECHO MEAS - RV V1 MAX: 58.7 CM/SEC
BH CV ECHO MEAS - RV V1 VTI: 13.5 CM
BH CV ECHO MEAS - RVOT DIAM: 2.23 CM
BH CV ECHO MEAS - RVSP: 50 MMHG
BH CV ECHO MEAS - SUP REN AO DIAM: 1.7 CM
BH CV ECHO MEAS - SV(LVOT): 48 ML
BH CV ECHO MEAS - SV(MOD-SP2): 28 ML
BH CV ECHO MEAS - SV(MOD-SP4): 25 ML
BH CV ECHO MEAS - SV(RVOT): 52.6 ML
BH CV ECHO MEAS - SVI(LVOT): 28.3 ML/M2
BH CV ECHO MEAS - SVI(MOD-SP2): 16.5 ML/M2
BH CV ECHO MEAS - SVI(MOD-SP4): 14.7 ML/M2
BH CV ECHO MEAS - TAPSE (>1.6): 1.79 CM
BH CV ECHO MEAS - TR MAX PG: 41.7 MMHG
BH CV ECHO MEAS - TR MAX VEL: 323.1 CM/SEC
BH CV ECHO MEASUREMENTS AVERAGE E/E' RATIO: 15.81
BH CV XLRA - RV BASE: 3.7 CM
BH CV XLRA - RV LENGTH: 6.4 CM
BH CV XLRA - RV MID: 3 CM
BH CV XLRA - TDI S': 7.2 CM/SEC
LEFT ATRIUM VOLUME INDEX: 39.5 ML/M2
SINUS: 2.8 CM
STJ: 2.33 CM

## 2024-07-31 PROCEDURE — 93306 TTE W/DOPPLER COMPLETE: CPT

## 2024-08-01 ENCOUNTER — DOCUMENTATION (OUTPATIENT)
Age: 84
End: 2024-08-01
Payer: MEDICARE

## 2024-08-01 DIAGNOSIS — R06.09 DYSPNEA ON EXERTION: Primary | ICD-10-CM

## 2024-08-01 NOTE — PROGRESS NOTES
Called patient to go over echo results.  She does have worsening mild/moderate to severe TR.  She is also complaining of ongoing shortness of breath and fluid overload.  I am going to have her increase her torsemide to 40 mg through the weekend and have her get blood work on Monday.

## 2024-08-05 ENCOUNTER — LAB (OUTPATIENT)
Dept: LAB | Facility: HOSPITAL | Age: 84
End: 2024-08-05
Payer: MEDICARE

## 2024-08-05 ENCOUNTER — TELEPHONE (OUTPATIENT)
Dept: CARDIOLOGY | Facility: CLINIC | Age: 84
End: 2024-08-05
Payer: MEDICARE

## 2024-08-05 DIAGNOSIS — I34.0 NONRHEUMATIC MITRAL VALVE REGURGITATION: Primary | ICD-10-CM

## 2024-08-05 DIAGNOSIS — R06.09 DYSPNEA ON EXERTION: ICD-10-CM

## 2024-08-05 DIAGNOSIS — I36.1 NONRHEUMATIC TRICUSPID VALVE REGURGITATION: ICD-10-CM

## 2024-08-05 LAB
ANION GAP SERPL CALCULATED.3IONS-SCNC: 10 MMOL/L (ref 5–15)
BUN SERPL-MCNC: 16 MG/DL (ref 8–23)
BUN/CREAT SERPL: 21.6 (ref 7–25)
CALCIUM SPEC-SCNC: 9.7 MG/DL (ref 8.6–10.5)
CHLORIDE SERPL-SCNC: 98 MMOL/L (ref 98–107)
CO2 SERPL-SCNC: 32 MMOL/L (ref 22–29)
CREAT SERPL-MCNC: 0.74 MG/DL (ref 0.57–1)
EGFRCR SERPLBLD CKD-EPI 2021: 79.9 ML/MIN/1.73
GLUCOSE SERPL-MCNC: 152 MG/DL (ref 65–99)
POTASSIUM SERPL-SCNC: 5.2 MMOL/L (ref 3.5–5.2)
SODIUM SERPL-SCNC: 140 MMOL/L (ref 136–145)

## 2024-08-05 PROCEDURE — 36415 COLL VENOUS BLD VENIPUNCTURE: CPT

## 2024-08-05 PROCEDURE — 80048 BASIC METABOLIC PNL TOTAL CA: CPT

## 2024-08-05 NOTE — PROGRESS NOTES
I called the patient and left a voicemail.  I wanted to let her know that her blood work looks good.  Over the weekend I had her increase her torsemide to 40 mg due to ongoing complaints of shortness of breath.  I wanted to see how she is feeling and if you guys could try to get a hold of her that would be much appreciated thanks!

## 2024-08-05 NOTE — TELEPHONE ENCOUNTER
----- Message from Robles Pettit sent at 8/5/2024  2:05 PM EDT -----  I called the patient and left a voicemail.  I wanted to let her know that her blood work looks good.  Over the weekend I had her increase her torsemide to 40 mg due to ongoing complaints of shortness of breath.  I wanted to see how she is feeling and if you guys could try to get a hold of her that would be much appreciated thanks!

## 2024-08-06 NOTE — TELEPHONE ENCOUNTER
Notified pt of recommendations from Robles. Pt verbalized understanding. I am going to call her Friday morning to see how she is doing and let Robles know if she needs a new script for the Torsemide.    Thank you,    Erika Cano, RN  Triage [facility] [place]  08/06/24 14:47 EDT

## 2024-08-06 NOTE — TELEPHONE ENCOUNTER
Can we have her keep taking the toresmide at 40 for 3 more days to see if this helps? It sounds positional in nature and some of this could be deconditioning     Thanks!  Robles

## 2024-08-06 NOTE — TELEPHONE ENCOUNTER
Pt called back. Went over results. She verbalized understanding.    Robles,    Pt said she thinks the extra Torsemide did help a little, but she is still SOA when she bends over. She is also still having trouble breathing when she lifts things or is moving around. She did say she was having an increased urine output. Do you have any further recommendations for her?    Thank you,    Erika Cano RN  [place] [facility]  08/06/24 12:07 EDT

## 2024-08-06 NOTE — TELEPHONE ENCOUNTER
Called and left a VM. Will continue to try to reach pt.    Erika Cano, RN  Triage [facility] [place]  08/06/24 09:55 EDT

## 2024-08-09 NOTE — TELEPHONE ENCOUNTER
Okay, lets just have her go back to her normal dose of torsemide.  This should help hopefully with the salt craving.     Her symptoms continue to be exertional in nature.  I wonder if her mitral valve regurgitation was actually worse than we anticipate due to her A-fib.  I will put in a consult for her to follow-up with someone in our valve clinic either Dr. Krishnamurthy or Dr. Alexis to look at her echo and provide some other input.

## 2024-08-09 NOTE — TELEPHONE ENCOUNTER
Notified pt of orders and recommendations from Robles. Pt verbalized understanding.    Thank you,    Erika Cano, RN  Triage [facility] [place]  08/09/24 13:17 EDT

## 2024-08-09 NOTE — TELEPHONE ENCOUNTER
Robles,    Called to check on patient. She took the extra Torsemide for another 3 days. She really hasn't noticed much of a difference with the SOA. She is craving salt more than normal now.    She said she thinks some of this is because she used to be more active, but now she gets pain in her legs and back when she walks for longer periods of time. She said her posture isn't great either.    Is there anything else she could try? Or, any other recommendations?    Thank you,    Erika Cano, SHERMAN  Triage [facility]  08/09/24 09:40 EDT

## 2024-08-22 ENCOUNTER — OFFICE VISIT (OUTPATIENT)
Age: 84
End: 2024-08-22
Payer: MEDICARE

## 2024-08-22 VITALS
DIASTOLIC BLOOD PRESSURE: 70 MMHG | SYSTOLIC BLOOD PRESSURE: 120 MMHG | WEIGHT: 150 LBS | BODY MASS INDEX: 26.58 KG/M2 | HEART RATE: 70 BPM | HEIGHT: 63 IN

## 2024-08-22 DIAGNOSIS — I50.32 CHRONIC DIASTOLIC (CONGESTIVE) HEART FAILURE: ICD-10-CM

## 2024-08-22 DIAGNOSIS — I35.1 NONRHEUMATIC AORTIC VALVE INSUFFICIENCY: Primary | ICD-10-CM

## 2024-08-22 DIAGNOSIS — I48.21 PERMANENT ATRIAL FIBRILLATION: ICD-10-CM

## 2024-08-22 RX ORDER — PANTOPRAZOLE SODIUM 40 MG/1
40 TABLET, DELAYED RELEASE ORAL AS NEEDED
COMMUNITY
Start: 2024-04-20

## 2024-08-22 RX ORDER — LEVOCETIRIZINE DIHYDROCHLORIDE 5 MG/1
5 TABLET, FILM COATED ORAL AS NEEDED
COMMUNITY
Start: 2024-02-28

## 2024-08-22 NOTE — PROGRESS NOTES
[facility] New Patient Office Note     Encounter Date:24  Patient:Jessica Davies  :1940  MRN:[id number]    Referring Provider: URBANO Paz    Consulted for: Mitral valve regurgitation    Chief Complaint:   Chief Complaint   Patient presents with    MitraClip Consult     History of Presenting Illness:      Ms. Davies is a 84 y.o. woman with past medical history notable for permanent atrial fibrillation, chronic diastolic congestive heart failure, nonrheumatic mitral valve regurgitation, chronic back pain and spine issues, borderline diabetes who presents to our office for initial evaluation regarding her mitral valve regurgitation.  She been following with our electrophysiology colleagues given her history of atrial fibrillation however there is concerns of worsening heart failure symptoms and she was noted to have moderate regurgitation of her mitral valve there is some concerns that maybe this was underestimated and she was referred to us for further evaluation.  In general she has been doing okay she has noticed a little bit more shortness of breath over the last year little bit more fatigue.  Unfortunately she has a lot of orthopedic issues which has been limiting her activities she is somewhat deconditioned as a result and does have shortness of breath with activity.  She also notices she has a lot of troubles bending over.  During this last year she was titrated on different diuretics.  She was tried on spironolactone but this caused breast sensitivity and she was taken off of this she was put on furosemide which was eventually changed over to torsemide.      Review of Systems:  Review of Systems   Constitutional: Positive for malaise/fatigue.   HENT: Negative.     Eyes: Negative.    Cardiovascular:  Positive for dyspnea on exertion.   Respiratory:  Positive for shortness of breath.    Endocrine: Negative.    Hematologic/Lymphatic: Negative.    Skin: Negative.     Musculoskeletal: Negative.    Gastrointestinal:  Positive for bloating.   Genitourinary: Negative.    Neurological: Negative.    Psychiatric/Behavioral: Negative.     Allergic/Immunologic: Negative.        Current Outpatient Medications on File Prior to Visit   Medication Sig Dispense Refill    acetaminophen (TYLENOL) 500 MG tablet Take 1 tablet by mouth Every 6 (Six) Hours As Needed for Mild Pain.      B Complex Vitamins (VITAMIN B COMPLEX PO) Take  by mouth.      Calcium-Magnesium-Vitamin D (CALCIUM MAGNESIUM PO) Take  by mouth.      clonazePAM (KlonoPIN) 1 MG tablet Take 1 tablet by mouth 2 (Two) Times a Day As Needed for Anxiety. 60 tablet 0    DIGESTIVE ENZYMES PO Take  by mouth Daily.      Eliquis 5 MG tablet tablet TAKE ONE TABLET BY MOUTH EVERY TWELVE HOURS 180 tablet 0    estradiol (ESTRACE) 0.1 MG/GM vaginal cream USE A ONE INCH STRIPE ON VULVA AND AT INTROITUS OF VAGINA AT BEDTIME FOR TWO WEEKS THEN TWICE WEEKLY      famotidine (PEPCID) 40 MG tablet Take 1 tablet by mouth Every Night.      levocetirizine (XYZAL) 5 MG tablet Take 1 tablet by mouth As Needed.      Licorice, Glycyrrhiza glabra, (LICORICE PO) Take  by mouth Daily.      nadolol (CORGARD) 80 MG tablet TAKE 1 TABLET BY MOUTH DAILY 90 tablet 1    pantoprazole (PROTONIX) 40 MG EC tablet Take 1 tablet by mouth As Needed.      Probiotic Product (PROBIOTIC ADVANCED PO) Take  by mouth Daily.      torsemide (DEMADEX) 20 MG tablet Take 1 tablet by mouth Daily. 30 tablet 1     No current facility-administered medications on file prior to visit.       Allergies   Allergen Reactions    Statins Myalgia    Azithromycin Palpitations    Cholestatin Palpitations    Contrast Dye (Echo Or Unknown Ct/Mr) Itching    Rivaroxaban Other (See Comments)     Tongue tingling    Sulfamethoxazole-Trimethoprim Rash       Past Medical History:   Diagnosis Date    Abdominal pain, LUQ (left upper quadrant)     Anxiety 01/03/2019    Atrial fibrillation     Birth defect      Small L Hand-partially developed digits    Cancer     Breast    Chest pain     DDD (degenerative disc disease), cervical     Neck and Lumbar    Diabetes mellitus type 2, controlled, without complications     Disease of thyroid gland     Early satiety     Esophageal reflux     Heartburn     Hyperlipidemia     Low back pain     Stroke     TIA       Past Surgical History:   Procedure Laterality Date    BREAST LUMPECTOMY Right     BREAST LUMPECTOMY Left     CATARACT EXTRACTION      COLONOSCOPY      D & C AND LAPAROSCOPY      ENDOSCOPY N/A 2021    Procedure: ESOPHAGOGASTRODUODENOSCOPY WITH BALLOON DILATATION AT 18, 19, AND 20 WITH COLD BIOPSIES;  Surgeon: Cash Hood MD;  Location: [facility] ENDOSCOPY;  Service: Gastroenterology;  Laterality: N/A;  PRE- GERD, DYSPHAGIA  POST- HIATAL HERNIA, SCHATZKIS RING    ENDOSCOPY N/A 2024    Procedure: ESOPHAGOGASTRODUODENOSCOPY WITH DILATATION;  Surgeon: Cash Hood MD;  Location: [facility]IN OR;  Service: Gastroenterology;  Laterality: N/A;  Balloon dilatation to 20mm, distal esophagus inflammation, hiatal hernia, schatzkis ring    MOHS SURGERY      SIGMOIDOSCOPY N/A 2024    Procedure: FLEXIBLE SIGMOIDOSCOPY WITH BIOPSY;  Surgeon: Cash Hood MD;  Location: [facility]IN OR;  Service: Gastroenterology;  Laterality: N/A;  rectal polyp       Social History     Socioeconomic History    Marital status:     Number of children: 2    Years of education: [employer]   Tobacco Use    Smoking status: Former     Current packs/day: 0.00     Average packs/day: 3.0 packs/day for 10.0 years (30.0 ttl pk-yrs)     Types: Cigarettes     Start date:      Quit date:      Years since quittin.6    Smokeless tobacco: Never    Tobacco comments:     quit 40 yrs ago.occas caffeine use    Vaping Use    Vaping status: Never Used   Substance and Sexual Activity    Alcohol use: Not Currently     Alcohol/week: 1.0 standard drink of alcohol     Types:  "1 Glasses of wine per week    Drug use: No    Sexual activity: Not Currently     Partners: Male     Comment:        Family History   Problem Relation Age of Onset    Early death Mother     Alcohol abuse Mother     Asthma Mother     Heart attack Mother     Heart failure Mother     Cancer Father         Prostate    Heart disease Father     Heart attack Father     Prostate cancer Father 70    Hyperlipidemia Sister     Diabetes Paternal Grandmother     Diabetes Paternal Grandfather     Heart failure Maternal Aunt     Brain cancer Maternal Aunt     Asthma Paternal Aunt     Colon cancer Neg Hx     Colon polyps Neg Hx     Crohn's disease Neg Hx     Irritable bowel syndrome Neg Hx     Ulcerative colitis Neg Hx        The following portions of the patient's history were reviewed and updated as appropriate: allergies, current medications, past family history, past medical history, past social history, past surgical history and problem list.       Objective:       Vitals:    08/22/24 1258   BP: 120/70   BP Location: Left arm   Patient Position: Sitting   Pulse: 70   Weight: 68 kg (150 lb)   Height: 160 cm (63\")       Body mass index is 26.57 kg/m².    Physical Exam:  Constitutional: Well appearing, Well-developed, No acute distress   HENT: Oropharynx clear and membrane moist  Eyes: Normal conjunctiva, no sclera icterus.  Neck: Supple, no carotid bruit bilaterally.  Cardiovascular: Regular rate and rhythm, No Murmur, No bilateral lower extremity edema.  Pulmonary: Normal respiratory effort, Normal lung sounds, no wheezing.  Neurological: Alert and orient x 3.   Skin: Warm, dry, no ecchymosis, no rash.  Psych: Appropriate mood and affect. Normal judgment and insight.      Lab Results   Component Value Date     08/05/2024     04/03/2024    K 5.2 08/05/2024    K 4.0 04/03/2024    CL 98 08/05/2024     04/03/2024    CO2 32.0 (H) 08/05/2024    CO2 24 04/03/2024    BUN 16 08/05/2024    BUN 11 04/03/2024    " "CREATININE 0.74 08/05/2024    CREATININE 0.63 04/03/2024    EGFRIFNONA 116 05/11/2021    EGFRIFNONA 96 01/22/2021    EGFRIFAFRI 117 01/22/2021    EGFRIFAFRI 122 07/08/2019    GLUCOSE 152 (H) 08/05/2024    GLUCOSE 113 (H) 04/03/2024    CALCIUM 9.7 08/05/2024    CALCIUM 9.4 04/03/2024    PROTENTOTREF 7.4 04/03/2024    PROTENTOTREF 7.3 07/06/2022    ALBUMIN 4.5 04/03/2024    ALBUMIN 4.2 07/06/2022    BILITOT 0.5 04/03/2024    BILITOT 0.4 07/06/2022    AST 25 04/03/2024    AST 19 07/06/2022    ALT 15 04/03/2024    ALT 16 07/06/2022     Lab Results   Component Value Date    WBC 8.1 04/03/2024    WBC 10.6 07/06/2022    HGB 14.1 04/03/2024    HGB 13.6 07/06/2022    HCT 44.6 04/03/2024    HCT 43.6 07/06/2022    MCV 80 04/03/2024    MCV 82 07/06/2022     04/03/2024     07/06/2022     Lab Results   Component Value Date    TRIG 206 (H) 04/03/2024    TRIG 221 (H) 07/06/2022    HDL 40 04/03/2024    HDL 43 07/06/2022     (H) 04/03/2024     (H) 07/06/2022     No results found for: \"PROBNP\", \"BNP\"  Lab Results   Component Value Date    CKTOTAL 30 06/13/2018     Lab Results   Component Value Date    TSH 2.15 04/01/2024         Echocardiogram 7/31/2024 images reviewed by myself:  Left ventricular systolic function is normal. Left ventricular ejection fraction appears to be 56 - 60%.  Left ventricular diastolic function was indeterminate.  The left atrial cavity is moderately dilated.  The right atrial cavity is mildly  dilated.  There is mild calcification of the aortic valve.  Moderate mitral valve regurgitation is present.  Moderate to severe tricuspid valve regurgitation is present.  Estimated right ventricular systolic pressure from tricuspid regurgitation is moderately elevated (45-55 mmHg).  There is a small (<1cm) pericardial effusion. There is no evidence of cardiac tamponade.     Echocardiogram 6/20/2022 images reviewed by myself:  Calculated right ventricular systolic pressure from tricuspid " regurgitation is 46.6 mmHg.  Estimated left ventricular EF = 60% Left ventricular systolic function is normal.  Left atrial volume is moderately increased.  Moderate mitral valve regurgitation is present  Mild pulmonary hypertension is present.  Moderate tricuspid valve regurgitation is present.          Assessment:          Diagnosis Plan   1. Nonrheumatic aortic valve insufficiency        2. Permanent atrial fibrillation        3. Chronic diastolic (congestive) heart failure               Plan:       Ms. Davies is a 84 y.o. woman with past medical history notable for permanent atrial fibrillation, chronic diastolic congestive heart failure, nonrheumatic mitral valve regurgitation, chronic back pain and spine issues, borderline diabetes who presents to our office for initial evaluation regarding her mitral valve regurgitation.  When I review her echocardiogram that was performed just this past month looks fairly similar to where it was 2 years ago outside of a new mild pericardial effusion which was noted on calcium scoring in 2023.  Symptoms wise she does have some shortness of breath we discussed options she had breast tenderness with spironolactone we discussed trying eplerenone she was little hesitant to add another diuretic she says overall she is doing okay she is seeing an endocrinologist later this month her A1c has been creeping up she has been trying diet but we did discuss that some of the newer agents out there for diabetes specifically Jardiance and Farxiga can actually have some improvement with heart failure and diuresis might be a good option for her since she being evaluated by endocrinology for her diabetes we will see if they think that would be a good option for her that might help treat both.  We discussed further evaluation such as heart catheterization to better define her hemodynamics and/or transesophageal echocardiogram but she was hesitant to do that thus far.  I think that is reasonable  as when I look at her echo I do think that her valve is at most moderate.  And looks to be somewhat functional in nature due to annular dilatation so I doubt that we are missing a fairly eccentric jet.  She would probably benefit from further medication adjustment she is like to hold tight until she sees her endocrinologist from my standpoint I will see her back in 6 months and can reevaluate symptoms and decide on follow-up testing.  I would recommend we closely follow her valve with at least an echo in a years time if symptoms were to worsen we could reconsider more invasive testing such as heart catheterization and/or transesophageal echocardiogram.    Nonrheumatic mitral valve regurgitation:  Moderate in severity on most recent echocardiogram and on my review also agree similar to findings on echocardiogram 6/2022  Appears functional in nature due to annular dilatation from left atrial dilation  Agree with diuretics  May also benefit from Jardiance/Farxiga and was going to discuss this with her endocrinologist when she sees the next    Chronic diastolic ingestive heart failure:  Continue current diuretics  Could consider eplerenone in the future  Had breast tenderness with spironolactone    Permanent atrial fibrillation:  Continue anticoagulation  Following with electrophysiology      Follow up:  6 months      Thank you for allowing me to participate in the care of Jessica JENIFER Davies. Feel free to contact me directly with any further questions or concerns.    Andrew Krishnamurthy MD  [facility]  08/22/24  17:50 EDT

## 2024-09-13 RX ORDER — APIXABAN 5 MG/1
TABLET, FILM COATED ORAL
Qty: 180 TABLET | Refills: 0 | Status: SHIPPED | OUTPATIENT
Start: 2024-09-13

## 2024-09-26 ENCOUNTER — TELEPHONE (OUTPATIENT)
Age: 84
End: 2024-09-26
Payer: MEDICARE

## 2024-09-30 RX ORDER — TORSEMIDE 20 MG/1
20 TABLET ORAL DAILY
Qty: 30 TABLET | Refills: 0 | Status: SHIPPED | OUTPATIENT
Start: 2024-09-30

## 2024-11-14 RX ORDER — TORSEMIDE 20 MG/1
20 TABLET ORAL DAILY
Qty: 30 TABLET | Refills: 0 | Status: SHIPPED | OUTPATIENT
Start: 2024-11-14

## 2024-11-25 ENCOUNTER — OFFICE VISIT (OUTPATIENT)
Age: 84
End: 2024-11-25
Payer: MEDICARE

## 2024-11-25 VITALS
SYSTOLIC BLOOD PRESSURE: 132 MMHG | HEART RATE: 92 BPM | DIASTOLIC BLOOD PRESSURE: 74 MMHG | HEIGHT: 63 IN | BODY MASS INDEX: 27.11 KG/M2 | WEIGHT: 153 LBS

## 2024-11-25 DIAGNOSIS — I48.21 PERMANENT ATRIAL FIBRILLATION: Primary | ICD-10-CM

## 2024-11-25 PROCEDURE — 93000 ELECTROCARDIOGRAM COMPLETE: CPT | Performed by: INTERNAL MEDICINE

## 2024-11-25 PROCEDURE — 99214 OFFICE O/P EST MOD 30 MIN: CPT | Performed by: INTERNAL MEDICINE

## 2024-11-25 RX ORDER — CLOBETASOL PROPIONATE 0.5 MG/G
1 CREAM TOPICAL 2 TIMES DAILY
COMMUNITY

## 2024-11-25 NOTE — PROGRESS NOTES
Date of Office Visit: 2024  Encounter Provider: Jorge Monteiro MD  Place of Service: [facility]  Patient Name: Jessica Davies  : 1940    Subjective:     Encounter Date:2024      Patient ID: Jessica Davies is a 84 y.o. female who has a cc of  perm AF and some hernandez (better with diuretic)       No anginal chest pain,   Mild to moderate hernandez, no change.   No soa,   No fainting,  No orthostasis.   No edema.   Exercise tolerance: decent     There have been no hospital admission since the last visit.     There have been no bleeding events.       Past Medical History:   Diagnosis Date    Abdominal pain, LUQ (left upper quadrant)     Anxiety 2019    Atrial fibrillation     Birth defect     Small L Hand-partially developed digits    Cancer     Breast    Chest pain     DDD (degenerative disc disease), cervical     Neck and Lumbar    Diabetes mellitus type 2, controlled, without complications     Disease of thyroid gland     Early satiety     Esophageal reflux     Heartburn     Hyperlipidemia     Low back pain     Stroke     TIA       Social History     Socioeconomic History    Marital status:     Number of children: 2    Years of education: [employer]   Tobacco Use    Smoking status: Former     Current packs/day: 0.00     Average packs/day: 3.0 packs/day for 10.0 years (30.0 ttl pk-yrs)     Types: Cigarettes     Start date:      Quit date:      Years since quittin.9    Smokeless tobacco: Never    Tobacco comments:     quit 40 yrs ago.occas caffeine use    Vaping Use    Vaping status: Never Used   Substance and Sexual Activity    Alcohol use: Not Currently     Alcohol/week: 1.0 standard drink of alcohol     Types: 1 Glasses of wine per week    Drug use: No    Sexual activity: Not Currently     Partners: Male     Comment:        Family History   Problem Relation Age of Onset    Early death Mother     Alcohol abuse Mother     Asthma Mother   "   Heart attack Mother     Heart failure Mother     Cancer Father         Prostate    Heart disease Father     Heart attack Father     Prostate cancer Father 70    Hyperlipidemia Sister     Diabetes Paternal Grandmother     Diabetes Paternal Grandfather     Heart failure Maternal Aunt     Brain cancer Maternal Aunt     Asthma Paternal Aunt     Colon cancer Neg Hx     Colon polyps Neg Hx     Crohn's disease Neg Hx     Irritable bowel syndrome Neg Hx     Ulcerative colitis Neg Hx        Review of Systems   Constitutional: Negative for fever and night sweats.   HENT:  Negative for ear pain and stridor.    Eyes:  Negative for discharge and visual halos.   Cardiovascular:  Negative for cyanosis.   Respiratory:  Negative for hemoptysis and sputum production.    Hematologic/Lymphatic: Negative for adenopathy.   Skin:  Negative for nail changes and unusual hair distribution.   Musculoskeletal:  Positive for arthritis. Negative for gout and joint swelling.   Gastrointestinal:  Negative for bowel incontinence and flatus.   Genitourinary:  Negative for dysuria and flank pain.   Neurological:  Negative for seizures and tremors.   Psychiatric/Behavioral:  Negative for altered mental status. The patient is not nervous/anxious.             Objective:     Vitals:    11/25/24 1302   BP: 132/74   BP Location: Right arm   Patient Position: Sitting   Pulse: 92   Weight: 69.4 kg (153 lb)   Height: 160 cm (63\")         Eyes:      General:         Right eye: No discharge.         Left eye: No discharge.   HENT:      Head: Normocephalic and atraumatic.   Neck:      Thyroid: No thyromegaly.      Vascular: No JVD.   Pulmonary:      Effort: Pulmonary effort is normal.      Breath sounds: Normal breath sounds. No rales.   Cardiovascular:      Normal rate. Irregularly irregular rhythm.      No gallop.    Edema:     Peripheral edema absent.   Abdominal:      General: Bowel sounds are normal.      Palpations: Abdomen is soft.      Tenderness: " There is no abdominal tenderness.   Musculoskeletal: Normal range of motion.         General: No deformity. Skin:     General: Skin is warm and dry.      Findings: No erythema.   Neurological:      Mental Status: Alert and oriented to person, place, and time.      Motor: Normal muscle tone.   Psychiatric:         Behavior: Behavior normal.         Thought Content: Thought content normal.           ECG 12 Lead    Date/Time: 11/25/2024 1:18 PM  Performed by: Jorge Monteiro MD    Authorized by: Jorge Monteiro MD  Comparison: compared with previous ECG   Similar to previous ECG  Rhythm: atrial fibrillation          Lab Review:       Assessment:          Diagnosis Plan   1. Permanent atrial fibrillation               Plan:     AF is rate controlled with nadolol. She has a normal LV. She is on a-ban.     Moderate MR -and TR. Saw femi. Better on diuretic

## 2024-12-16 RX ORDER — TORSEMIDE 20 MG/1
20 TABLET ORAL DAILY
Qty: 90 TABLET | Refills: 3 | Status: SHIPPED | OUTPATIENT
Start: 2024-12-16

## 2024-12-26 ENCOUNTER — TELEPHONE (OUTPATIENT)
Age: 84
End: 2024-12-26
Payer: MEDICARE

## 2024-12-26 NOTE — TELEPHONE ENCOUNTER
Received message regarding pt wanting to know if there is someone in [place] that you would recommend see. Pt stated her son is having some issues with Afib, but is currently in [place] being treated. Pt stated son may have to have an ablation. Pt stated you have taken such good care of her, she would love for you to recommend someone for her son to see there.    Please advise of recommendations, if any.    TOÑO Bynum

## 2025-01-02 RX ORDER — NADOLOL 80 MG/1
80 TABLET ORAL DAILY
Qty: 90 TABLET | Refills: 0 | Status: SHIPPED | OUTPATIENT
Start: 2025-01-02

## 2025-01-02 RX ORDER — APIXABAN 5 MG/1
TABLET, FILM COATED ORAL
Qty: 180 TABLET | Refills: 0 | Status: SHIPPED | OUTPATIENT
Start: 2025-01-02

## 2025-01-08 RX ORDER — PANTOPRAZOLE SODIUM 40 MG/1
40 TABLET, DELAYED RELEASE ORAL DAILY
Qty: 30 TABLET | Refills: 0 | Status: SHIPPED | OUTPATIENT
Start: 2025-01-08

## 2025-02-03 RX ORDER — PANTOPRAZOLE SODIUM 40 MG/1
40 TABLET, DELAYED RELEASE ORAL DAILY
Qty: 30 TABLET | Refills: 0 | Status: SHIPPED | OUTPATIENT
Start: 2025-02-03

## 2025-02-24 ENCOUNTER — OFFICE VISIT (OUTPATIENT)
Age: 85
End: 2025-02-24
Payer: MEDICARE

## 2025-02-24 VITALS
DIASTOLIC BLOOD PRESSURE: 80 MMHG | HEART RATE: 91 BPM | WEIGHT: 149 LBS | HEIGHT: 63 IN | SYSTOLIC BLOOD PRESSURE: 116 MMHG | BODY MASS INDEX: 26.4 KG/M2

## 2025-02-24 DIAGNOSIS — I48.21 PERMANENT ATRIAL FIBRILLATION: ICD-10-CM

## 2025-02-24 DIAGNOSIS — I36.1 NONRHEUMATIC TRICUSPID VALVE REGURGITATION: ICD-10-CM

## 2025-02-24 DIAGNOSIS — I50.32 CHRONIC DIASTOLIC (CONGESTIVE) HEART FAILURE: ICD-10-CM

## 2025-02-24 DIAGNOSIS — I34.0 NONRHEUMATIC MITRAL VALVE REGURGITATION: Primary | ICD-10-CM

## 2025-02-24 DIAGNOSIS — I27.20 PULMONARY HYPERTENSION: ICD-10-CM

## 2025-02-24 PROCEDURE — 1159F MED LIST DOCD IN RCRD: CPT | Performed by: INTERNAL MEDICINE

## 2025-02-24 PROCEDURE — 1160F RVW MEDS BY RX/DR IN RCRD: CPT | Performed by: INTERNAL MEDICINE

## 2025-02-24 PROCEDURE — 99214 OFFICE O/P EST MOD 30 MIN: CPT | Performed by: INTERNAL MEDICINE

## 2025-02-24 NOTE — PROGRESS NOTES
[facility] Follow Up Office Note     Encounter Date:25  Patient:Jessica Davies  :1940  MRN:[id number]    Chief Complaint:   Chief Complaint   Patient presents with    Nonrheumatic aortic valve insufficiency     6 month f/u     History of Presenting Illness:      Ms. Davies is a 84 y.o. woman with past medical history notable for permanent atrial fibrillation, chronic diastolic congestive heart failure, nonrheumatic mitral valve regurgitation, chronic back pain and spine issues, borderline diabetes who presents to our office for scheduled follow-up regarding her valvular heart disease in general she is doing reasonably well has no major changes in symptoms.  When I last saw her about 6 months ago for an initial visit she had just switched over to torsemide was actually starting to feel better on this medication.  We actually discussed considering Jardiance she is seeing her endocrinologist and wanted to talk about that with her endocrinologist before starting as there were some discussions on starting other diabetic medications.  She was eventually started on Mounjaro.    Review of Systems:  Review of Systems   Constitutional: Negative.   HENT: Negative.     Eyes: Negative.    Cardiovascular: Negative.    Respiratory:  Positive for shortness of breath.    Endocrine: Negative.    Hematologic/Lymphatic: Negative.    Skin: Negative.    Musculoskeletal: Negative.    Gastrointestinal: Negative.    Genitourinary: Negative.    Neurological: Negative.    Psychiatric/Behavioral: Negative.     Allergic/Immunologic: Negative.        Current Outpatient Medications on File Prior to Visit   Medication Sig Dispense Refill    acetaminophen (TYLENOL) 500 MG tablet Take 1 tablet by mouth Every 6 (Six) Hours As Needed for Mild Pain.      B Complex Vitamins (VITAMIN B COMPLEX PO) Take  by mouth.      Calcium-Magnesium-Vitamin D (CALCIUM MAGNESIUM PO) Take  by mouth.      clonazePAM (KlonoPIN) 1 MG tablet Take  1 tablet by mouth 2 (Two) Times a Day As Needed for Anxiety. 60 tablet 0    DIGESTIVE ENZYMES PO Take  by mouth Daily.      Eliquis 5 MG tablet tablet TAKE ONE TABLET BY MOUTH EVERY TWELVE HOURS 180 tablet 0    estradiol (ESTRACE) 0.1 MG/GM vaginal cream USE A ONE INCH STRIPE ON VULVA AND AT INTROITUS OF VAGINA AT BEDTIME FOR TWO WEEKS THEN TWICE WEEKLY      famotidine (PEPCID) 40 MG tablet Take 1 tablet by mouth Every Night.      levocetirizine (XYZAL) 5 MG tablet Take 1 tablet by mouth As Needed.      Licorice, Glycyrrhiza glabra, (LICORICE PO) Take  by mouth Daily.      nadolol (CORGARD) 80 MG tablet TAKE 1 TABLET BY MOUTH DAILY 90 tablet 0    pantoprazole (PROTONIX) 40 MG EC tablet TAKE 1 TABLET BY MOUTH DAILY (Patient taking differently: Take 1 tablet by mouth As Needed.) 30 tablet 0    Probiotic Product (PROBIOTIC ADVANCED PO) Take  by mouth Daily.      torsemide (DEMADEX) 20 MG tablet TAKE 1 TABLET BY MOUTH ONCE DAILY 90 tablet 3    clobetasol propionate (TEMOVATE) 0.05 % cream Apply 1 Application topically to the appropriate area as directed 2 (Two) Times a Day. (Patient not taking: Reported on 2/24/2025)       No current facility-administered medications on file prior to visit.       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    Cancer     Breast    Chest pain     DDD (degenerative disc disease), cervical     Neck and Lumbar    Diabetes mellitus type 2, controlled, without complications     Disease of thyroid gland     Early satiety     Esophageal reflux     Heartburn     Hyperlipidemia     Low back pain      Stroke     TIA       Past Surgical History:   Procedure Laterality Date    BREAST LUMPECTOMY Right     BREAST LUMPECTOMY Left     CATARACT EXTRACTION      COLONOSCOPY      D & C AND LAPAROSCOPY      ENDOSCOPY N/A 2021    Procedure: ESOPHAGOGASTRODUODENOSCOPY WITH BALLOON DILATATION AT 18, 19, AND 20 WITH COLD BIOPSIES;  Surgeon: Cash Hood MD;  Location: [facility] ENDOSCOPY;  Service: Gastroenterology;  Laterality: N/A;  PRE- GERD, DYSPHAGIA  POST- HIATAL HERNIA, SCHATZKIS RING    ENDOSCOPY N/A 2024    Procedure: ESOPHAGOGASTRODUODENOSCOPY WITH DILATATION;  Surgeon: Cash Hood MD;  Location: [facility] MAIN OR;  Service: Gastroenterology;  Laterality: N/A;  Balloon dilatation to 20mm, distal esophagus inflammation, hiatal hernia, schatzkis ring    MOHS SURGERY      SIGMOIDOSCOPY N/A 2024    Procedure: FLEXIBLE SIGMOIDOSCOPY WITH BIOPSY;  Surgeon: Cash Hood MD;  Location: [facility] MA[facility];  Service: Gastroenterology;  Laterality: N/A;  rectal polyp       Social History     Socioeconomic History    Marital status:     Number of children: 2    Years of education: [employer]   Tobacco Use    Smoking status: Former     Current packs/day: 0.00     Average packs/day: 3.0 packs/day for 10.0 years (30.0 ttl pk-yrs)     Types: Cigarettes     Start date:      Quit date:      Years since quittin.1    Smokeless tobacco: Never    Tobacco comments:     quit 40 yrs ago.occas caffeine use    Vaping Use    Vaping status: Never Used   Substance and Sexual Activity    Alcohol use: Not Currently     Alcohol/week: 1.0 standard drink of alcohol     Types: 1 Glasses of wine per week    Drug use: No    Sexual activity: Not Currently     Partners: Male     Comment:        Family History   Problem Relation Age of Onset    Early death Mother     Alcohol abuse Mother     Asthma Mother     Heart attack Mother     Heart failure Mother     Cancer Father         Prostate    Heart  "disease Father     Heart attack Father     Prostate cancer Father 70    Hyperlipidemia Sister     Diabetes Paternal Grandmother     Diabetes Paternal Grandfather     Heart failure Maternal Aunt     Brain cancer Maternal Aunt     Asthma Paternal Aunt     Colon cancer Neg Hx     Colon polyps Neg Hx     Crohn's disease Neg Hx     Irritable bowel syndrome Neg Hx     Ulcerative colitis Neg Hx        The following portions of the patient's history were reviewed and updated as appropriate: allergies, current medications, past family history, past medical history, past social history, past surgical history and problem list.       Objective:       Vitals:    02/24/25 1243   BP: 116/80   BP Location: Left arm   Patient Position: Sitting   Pulse: 91   Weight: 67.6 kg (149 lb)   Height: 160 cm (63\")       Body mass index is 26.39 kg/m².    Physical Exam:  Constitutional: Well appearing, Well-developed, No acute distress   HENT: Oropharynx clear and membrane moist  Eyes: Normal conjunctiva, no sclera icterus.  Neck: Supple, no carotid bruit bilaterally.  Cardiovascular: Regular rate and rhythm, No Murmur, No bilateral lower extremity edema.  Pulmonary: Normal respiratory effort, normal lung sounds, no wheezing.  Neurological: Alert and orient x 3.   Skin: Warm, dry, no ecchymosis, no rash.  Psych: Appropriate mood and affect. Normal judgment and insight.       Lab Results   Component Value Date     08/05/2024     04/03/2024    K 5.2 08/05/2024    K 4.0 04/03/2024    CL 98 08/05/2024     04/03/2024    CO2 32.0 (H) 08/05/2024    CO2 24 04/03/2024    BUN 16 08/05/2024    BUN 11 04/03/2024    CREATININE 0.74 08/05/2024    CREATININE 0.63 04/03/2024    EGFRIFNONA 116 05/11/2021    EGFRIFNONA 96 01/22/2021    EGFRIFAFRI 117 01/22/2021    EGFRIFAFRI 122 07/08/2019    GLUCOSE 152 (H) 08/05/2024    GLUCOSE 113 (H) 04/03/2024    CALCIUM 9.7 08/05/2024    CALCIUM 9.4 04/03/2024    ALBUMIN 4.5 04/03/2024    ALBUMIN 4.2 " "07/06/2022    BILITOT 0.5 04/03/2024    BILITOT 0.4 07/06/2022    AST 25 04/03/2024    AST 19 07/06/2022    ALT 15 04/03/2024    ALT 16 07/06/2022     Lab Results   Component Value Date    WBC 8.1 04/03/2024    WBC 10.6 07/06/2022    HGB 14.1 04/03/2024    HGB 13.6 07/06/2022    HCT 44.6 04/03/2024    HCT 43.6 07/06/2022    MCV 80 04/03/2024    MCV 82 07/06/2022     04/03/2024     07/06/2022     Lab Results   Component Value Date    TRIG 206 (H) 04/03/2024    TRIG 221 (H) 07/06/2022    HDL 40 04/03/2024    HDL 43 07/06/2022     (H) 04/03/2024     (H) 07/06/2022     No results found for: \"PROBNP\", \"BNP\"  Lab Results   Component Value Date    CKTOTAL 30 06/13/2018     Lab Results   Component Value Date    TSH 1.42 01/23/2025    TSH 2.77 09/23/2024         Echocardiogram 7/31/2024:  Left ventricular systolic function is normal. Left ventricular ejection fraction appears to be 56 - 60%.  Left ventricular diastolic function was indeterminate.  The left atrial cavity is moderately dilated.  The right atrial cavity is mildly  dilated.  There is mild calcification of the aortic valve.  Moderate mitral valve regurgitation is present.  Moderate to severe tricuspid valve regurgitation is present.  Estimated right ventricular systolic pressure from tricuspid regurgitation is moderately elevated (45-55 mmHg).  There is a small (<1cm) pericardial effusion. There is no evidence of cardiac tamponade.     Echocardiogram 6/20/2022:  Calculated right ventricular systolic pressure from tricuspid regurgitation is 46.6 mmHg.  Estimated left ventricular EF = 60% Left ventricular systolic function is normal.  Left atrial volume is moderately increased.  Moderate mitral valve regurgitation is present  Mild pulmonary hypertension is present.  Moderate tricuspid valve regurgitation is present.          Assessment:          Diagnosis Plan   1. Nonrheumatic mitral valve regurgitation  Adult Transthoracic Echo " Complete W/ Cont if Necessary Per Protocol      2. Nonrheumatic tricuspid valve regurgitation  Adult Transthoracic Echo Complete W/ Cont if Necessary Per Protocol      3. Pulmonary hypertension  Adult Transthoracic Echo Complete W/ Cont if Necessary Per Protocol      4. Permanent atrial fibrillation        5. Chronic diastolic (congestive) heart failure                 Plan:       Ms. Davies is a 84 y.o. woman with past medical history notable for permanent atrial fibrillation, chronic diastolic congestive heart failure, nonrheumatic mitral valve regurgitation, chronic back pain and spine issues, borderline diabetes who presents to our office for follow-up regarding her mitral valve regurgitation.  In general doing fairly well like to see where echocardiogram is 6 months if seeing any progression or no improvement may consider more invasive testing such as right heart catheterization or MANISH at that time.  She was doing better when I saw her 6 months ago after changing to torsemide and still seemed to be doing fairly well.  Will follow-up on her echocardiogram in 6 months and decide on testing    Nonrheumatic mitral/tricuspid valve regurgitation:  Moderate in severity on most recent echocardiogram and on my review also agree similar to findings on echocardiogram 6/2022  Appears functional in nature due to annular dilatation from left atrial dilation  Agree with diuretics  Would benefit from considering eplerenone or Jardiance in the future but clinically doing well will follow-up on repeat echocardiogram in 6 months    Chronic diastolic congestive heart failure:  Continue current diuretics  Could consider eplerenone in the future  Had breast tenderness with spironolactone    Permanent atrial fibrillation:  Continue anticoagulation  Following with electrophysiology      Follow up:  6 months with echocardiogram      Thank you for allowing me to participate in the care of Jessica Davies. Feel free to contact me  directly with any further questions or concerns.    Andrew Krishnamurthy MD  [facility]  02/24/25  15:00 EST

## 2025-03-24 RX ORDER — PANTOPRAZOLE SODIUM 40 MG/1
40 TABLET, DELAYED RELEASE ORAL DAILY
Qty: 30 TABLET | Refills: 0 | OUTPATIENT
Start: 2025-03-24

## 2025-03-28 RX ORDER — NADOLOL 80 MG/1
80 TABLET ORAL DAILY
Qty: 30 TABLET | Refills: 1 | Status: SHIPPED | OUTPATIENT
Start: 2025-03-28

## 2025-04-03 RX ORDER — APIXABAN 5 MG/1
5 TABLET, FILM COATED ORAL EVERY 12 HOURS SCHEDULED
Qty: 180 TABLET | Refills: 0 | Status: SHIPPED | OUTPATIENT
Start: 2025-04-03

## 2025-06-02 RX ORDER — NADOLOL 80 MG/1
80 TABLET ORAL DAILY
Qty: 90 TABLET | Refills: 3 | Status: SHIPPED | OUTPATIENT
Start: 2025-06-02

## 2025-06-30 RX ORDER — APIXABAN 5 MG/1
5 TABLET, FILM COATED ORAL EVERY 12 HOURS SCHEDULED
Qty: 180 TABLET | Refills: 0 | Status: SHIPPED | OUTPATIENT
Start: 2025-06-30

## 2025-08-21 ENCOUNTER — OFFICE VISIT (OUTPATIENT)
Age: 85
End: 2025-08-21
Payer: MEDICARE

## 2025-08-21 VITALS
BODY MASS INDEX: 24.27 KG/M2 | DIASTOLIC BLOOD PRESSURE: 78 MMHG | SYSTOLIC BLOOD PRESSURE: 128 MMHG | HEIGHT: 63 IN | HEART RATE: 74 BPM | WEIGHT: 137 LBS

## 2025-08-21 DIAGNOSIS — I48.21 PERMANENT ATRIAL FIBRILLATION: Primary | ICD-10-CM

## 2025-08-21 PROCEDURE — 1160F RVW MEDS BY RX/DR IN RCRD: CPT

## 2025-08-21 PROCEDURE — 1159F MED LIST DOCD IN RCRD: CPT

## 2025-08-21 PROCEDURE — 99213 OFFICE O/P EST LOW 20 MIN: CPT

## 2025-08-21 PROCEDURE — 93000 ELECTROCARDIOGRAM COMPLETE: CPT

## 2025-08-21 RX ORDER — DIPHENHYDRAMINE HCL 25 MG
CAPSULE ORAL
COMMUNITY

## 2025-08-21 RX ORDER — PRAVASTATIN SODIUM 10 MG
10 TABLET ORAL DAILY
COMMUNITY
Start: 2025-08-18 | End: 2026-08-18

## 2025-08-21 RX ORDER — TIRZEPATIDE 2.5 MG/.5ML
INJECTION, SOLUTION SUBCUTANEOUS
COMMUNITY
Start: 2025-08-12

## (undated) DEVICE — DEV INFL CRE STERIFLATE 60CC DISP

## (undated) DEVICE — GOWN ISOL W/THUMB UNIV BLU BX/15

## (undated) DEVICE — ADAPT CLN SCPE ENDO PORPOISE BX/50 DISP

## (undated) DEVICE — SINGLE-USE BIOPSY FORCEPS: Brand: RADIAL JAW 4

## (undated) DEVICE — LASSO POLYPECTOMY SNARE: Brand: LASSO

## (undated) DEVICE — SENSR O2 OXIMAX FNGR A/ 18IN NONSTR

## (undated) DEVICE — KT ORCA ORCAPOD DISP STRL

## (undated) DEVICE — ESOPHAGEAL BALLOON DILATATION CATHETER: Brand: CRE FIXED WIRE

## (undated) DEVICE — TUBING, SUCTION, 1/4" X 10', STRAIGHT: Brand: MEDLINE

## (undated) DEVICE — GOWN ,SIRUS,NONREINFORCED 3XL: Brand: MEDLINE

## (undated) DEVICE — THE SINGLE USE ETRAP – POLYP TRAP IS USED FOR SUCTION RETRIEVAL OF ENDOSCOPICALLY REMOVED POLYPS.: Brand: ETRAP

## (undated) DEVICE — BITEBLOCK OMNI BLOC

## (undated) DEVICE — Device

## (undated) DEVICE — LN SMPL CO2 SHTRM SD STREAM W/M LUER

## (undated) DEVICE — MSK ENDO PORT O2 POM ELITE CURAPLEX A/

## (undated) DEVICE — DEV INFL ALLIANCE2 SYS

## (undated) DEVICE — ADAPT CLN BIOGUARD AIR/H2O DISP

## (undated) DEVICE — FRCP BX RADJAW4 NDL 2.8 240CM LG OG BX40

## (undated) DEVICE — FLEX ADVANTAGE 1500CC: Brand: FLEX ADVANTAGE

## (undated) DEVICE — VIAL FORMLN CAP 10PCT 20ML

## (undated) DEVICE — CANN O2 ETCO2 FITS ALL CONN CO2 SMPL A/ 7IN DISP LF